# Patient Record
Sex: FEMALE | URBAN - METROPOLITAN AREA
[De-identification: names, ages, dates, MRNs, and addresses within clinical notes are randomized per-mention and may not be internally consistent; named-entity substitution may affect disease eponyms.]

---

## 2023-06-29 ENCOUNTER — OFFICE VISIT (OUTPATIENT)
Dept: FAMILY MEDICINE CLINIC | Facility: HOSPITAL | Age: 28
End: 2023-06-29
Payer: COMMERCIAL

## 2023-06-29 VITALS
SYSTOLIC BLOOD PRESSURE: 102 MMHG | BODY MASS INDEX: 27.01 KG/M2 | DIASTOLIC BLOOD PRESSURE: 76 MMHG | HEIGHT: 64 IN | TEMPERATURE: 98.7 F | WEIGHT: 158.2 LBS | HEART RATE: 72 BPM

## 2023-06-29 DIAGNOSIS — J31.0 CHRONIC RHINITIS: ICD-10-CM

## 2023-06-29 DIAGNOSIS — F41.9 ANXIETY: ICD-10-CM

## 2023-06-29 DIAGNOSIS — R51.9 CHRONIC NONINTRACTABLE HEADACHE, UNSPECIFIED HEADACHE TYPE: Primary | ICD-10-CM

## 2023-06-29 DIAGNOSIS — G89.29 CHRONIC NONINTRACTABLE HEADACHE, UNSPECIFIED HEADACHE TYPE: Primary | ICD-10-CM

## 2023-06-29 DIAGNOSIS — E66.3 OVERWEIGHT (BMI 25.0-29.9): ICD-10-CM

## 2023-06-29 DIAGNOSIS — R63.4 UNINTENTIONAL WEIGHT LOSS: ICD-10-CM

## 2023-06-29 DIAGNOSIS — F32.0 CURRENT MILD EPISODE OF MAJOR DEPRESSIVE DISORDER WITHOUT PRIOR EPISODE (HCC): ICD-10-CM

## 2023-06-29 PROCEDURE — 99203 OFFICE O/P NEW LOW 30 MIN: CPT | Performed by: INTERNAL MEDICINE

## 2023-06-29 RX ORDER — FEXOFENADINE HCL 180 MG/1
180 TABLET ORAL DAILY PRN
Qty: 30 TABLET | Refills: 5 | Status: SHIPPED | OUTPATIENT
Start: 2023-06-29

## 2023-06-29 RX ORDER — AMITRIPTYLINE HYDROCHLORIDE 25 MG/1
25 TABLET, FILM COATED ORAL
Qty: 30 TABLET | Refills: 1 | Status: SHIPPED | OUTPATIENT
Start: 2023-06-29

## 2023-06-29 RX ORDER — FEXOFENADINE HCL 180 MG/1
180 TABLET ORAL DAILY PRN
COMMUNITY
End: 2023-06-29 | Stop reason: SDUPTHER

## 2023-06-29 NOTE — ASSESSMENT & PLAN NOTE
D/w pt that sounds like chronic neck pain and HA's may be related to mood, however currently HA's daily and almost daily NSAID use noted - SE of daily NSAIDs with rebound HA's reviewed, abortive vs preventative tx reviewed - pt open to try daily preventative medication - options with TCA/Gabapentin/Duloxetine reviewed, has concurrent anxiety/depression/insomnia so will start with TCA - Amitriptyline 25 mg 1 tab PO qhs sent, SE reviewed,  d/w pt that it takes 4-6 wks to get maximum benefit of med and that med has to be taken every day and to not miss doses of med, call with SE or with any red flag Neuro symptoms (d/w pt in detail), may benefit from PT in future for neck - will follow

## 2023-06-29 NOTE — ASSESSMENT & PLAN NOTE
Noting down and anxious mood - depression more of an issue then anxiety - will try trial of Amitriptyline 25 mg 1 tab PO qhs,  d/w pt that it takes 4-6 wks to get maximum benefit of med and that med has to be taken every day and to not miss doses of med, call with SE/new/worse mood/panic attacks, re-eval in 6 wks, pt deferring therapist eval at this time

## 2023-06-29 NOTE — ASSESSMENT & PLAN NOTE
Noting down and anxious mood - depression more of an issue then anxiety - will try trial of Amitriptyline 25 mg 1 tab PO qhs,  d/w pt that it takes 4-6 wks to get maximum benefit of med and that med has to be taken every day and to not miss doses of med, call with SE/new/worse mood/SI, re-eval in 6 wks, pt deferring therapist eval at this time
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
## 2023-07-18 LAB
ALBUMIN SERPL-MCNC: 4.5 G/DL (ref 4–5)
ALBUMIN/GLOB SERPL: 2.3 {RATIO} (ref 1.2–2.2)
ALP SERPL-CCNC: 82 IU/L (ref 44–121)
ALT SERPL-CCNC: 11 IU/L (ref 0–32)
AST SERPL-CCNC: 16 IU/L (ref 0–40)
BASOPHILS # BLD AUTO: 0.1 X10E3/UL (ref 0–0.2)
BASOPHILS NFR BLD AUTO: 1 %
BILIRUB SERPL-MCNC: 0.8 MG/DL (ref 0–1.2)
BUN SERPL-MCNC: 13 MG/DL (ref 6–20)
BUN/CREAT SERPL: 18 (ref 9–23)
CALCIUM SERPL-MCNC: 9.3 MG/DL (ref 8.7–10.2)
CHLORIDE SERPL-SCNC: 105 MMOL/L (ref 96–106)
CHOLEST SERPL-MCNC: 175 MG/DL (ref 100–199)
CHOLEST/HDLC SERPL: 3.1 RATIO (ref 0–4.4)
CO2 SERPL-SCNC: 23 MMOL/L (ref 20–29)
CREAT SERPL-MCNC: 0.73 MG/DL (ref 0.57–1)
EGFR: 116 ML/MIN/1.73
EOSINOPHIL # BLD AUTO: 0.2 X10E3/UL (ref 0–0.4)
EOSINOPHIL NFR BLD AUTO: 2 %
ERYTHROCYTE [DISTWIDTH] IN BLOOD BY AUTOMATED COUNT: 12.6 % (ref 11.7–15.4)
GLOBULIN SER-MCNC: 2 G/DL (ref 1.5–4.5)
GLUCOSE SERPL-MCNC: 99 MG/DL (ref 70–99)
HCT VFR BLD AUTO: 40.2 % (ref 34–46.6)
HDLC SERPL-MCNC: 56 MG/DL
HGB BLD-MCNC: 12.6 G/DL (ref 11.1–15.9)
IMM GRANULOCYTES # BLD: 0 X10E3/UL (ref 0–0.1)
IMM GRANULOCYTES NFR BLD: 0 %
LDLC SERPL CALC-MCNC: 103 MG/DL (ref 0–99)
LYMPHOCYTES # BLD AUTO: 3.7 X10E3/UL (ref 0.7–3.1)
LYMPHOCYTES NFR BLD AUTO: 36 %
MCH RBC QN AUTO: 24 PG (ref 26.6–33)
MCHC RBC AUTO-ENTMCNC: 31.3 G/DL (ref 31.5–35.7)
MCV RBC AUTO: 77 FL (ref 79–97)
MONOCYTES # BLD AUTO: 0.6 X10E3/UL (ref 0.1–0.9)
MONOCYTES NFR BLD AUTO: 6 %
NEUTROPHILS # BLD AUTO: 5.8 X10E3/UL (ref 1.4–7)
NEUTROPHILS NFR BLD AUTO: 55 %
PLATELET # BLD AUTO: 244 X10E3/UL (ref 150–450)
POTASSIUM SERPL-SCNC: 4.2 MMOL/L (ref 3.5–5.2)
PROT SERPL-MCNC: 6.5 G/DL (ref 6–8.5)
RBC # BLD AUTO: 5.24 X10E6/UL (ref 3.77–5.28)
SL AMB VLDL CHOLESTEROL CALC: 16 MG/DL (ref 5–40)
SODIUM SERPL-SCNC: 142 MMOL/L (ref 134–144)
TRIGL SERPL-MCNC: 89 MG/DL (ref 0–149)
TSH SERPL DL<=0.005 MIU/L-ACNC: 1.25 UIU/ML (ref 0.45–4.5)
WBC # BLD AUTO: 10.4 X10E3/UL (ref 3.4–10.8)

## 2023-08-24 ENCOUNTER — OFFICE VISIT (OUTPATIENT)
Dept: FAMILY MEDICINE CLINIC | Facility: HOSPITAL | Age: 28
End: 2023-08-24
Payer: COMMERCIAL

## 2023-08-24 ENCOUNTER — OFFICE VISIT (OUTPATIENT)
Dept: URGENT CARE | Facility: CLINIC | Age: 28
End: 2023-08-24
Payer: COMMERCIAL

## 2023-08-24 VITALS
DIASTOLIC BLOOD PRESSURE: 68 MMHG | SYSTOLIC BLOOD PRESSURE: 127 MMHG | OXYGEN SATURATION: 99 % | RESPIRATION RATE: 18 BRPM | HEART RATE: 78 BPM

## 2023-08-24 VITALS
DIASTOLIC BLOOD PRESSURE: 60 MMHG | BODY MASS INDEX: 26.84 KG/M2 | WEIGHT: 157.2 LBS | OXYGEN SATURATION: 99 % | TEMPERATURE: 97.8 F | SYSTOLIC BLOOD PRESSURE: 122 MMHG | HEART RATE: 86 BPM | HEIGHT: 64 IN

## 2023-08-24 DIAGNOSIS — F32.0 CURRENT MILD EPISODE OF MAJOR DEPRESSIVE DISORDER WITHOUT PRIOR EPISODE (HCC): ICD-10-CM

## 2023-08-24 DIAGNOSIS — R51.9 CHRONIC NONINTRACTABLE HEADACHE, UNSPECIFIED HEADACHE TYPE: ICD-10-CM

## 2023-08-24 DIAGNOSIS — M79.642 HAND PAIN, LEFT: ICD-10-CM

## 2023-08-24 DIAGNOSIS — F41.9 ANXIETY: ICD-10-CM

## 2023-08-24 DIAGNOSIS — S60.222A CONTUSION OF LEFT HAND, INITIAL ENCOUNTER: Primary | ICD-10-CM

## 2023-08-24 DIAGNOSIS — G89.29 CHRONIC NONINTRACTABLE HEADACHE, UNSPECIFIED HEADACHE TYPE: ICD-10-CM

## 2023-08-24 PROCEDURE — 99214 OFFICE O/P EST MOD 30 MIN: CPT | Performed by: INTERNAL MEDICINE

## 2023-08-24 PROCEDURE — 99213 OFFICE O/P EST LOW 20 MIN: CPT

## 2023-08-24 RX ORDER — AMITRIPTYLINE HYDROCHLORIDE 50 MG/1
50 TABLET, FILM COATED ORAL
Qty: 30 TABLET | Refills: 2 | Status: SHIPPED | OUTPATIENT
Start: 2023-08-24

## 2023-08-24 NOTE — PROGRESS NOTES
[facility]        NAME: Robbert Koyanagi is a 32 y.o. female  : 1995    MRN: [id number]  DATE: 2023  TIME: 6:42 PM    Assessment and Plan   Contusion of left hand, initial encounter [S60.222A]  1. Contusion of left hand, initial encounter        2. Hand pain, left  XR hand 3+ vw left            Patient Instructions     Your x-rays were read by the provider. A radiologist will also read the x-rays and you will be notified of any abnormalities. Apply ice for 15min, 2-3 times daily over the next 2-3 days. You can take Tylenol or Motrin for pain. Follow-up with your PCP as needed / for continued hand pain. Go to the ED for any severely worsening symptoms. Chief Complaint     Chief Complaint   Patient presents with   • Hand Pain     Patient has left hand pain after banging it into a door this morning. History of Present Illness       This is a 27yo female who presents for evaluation of left hand pain after she smacked it against a wooden doorframe earlier today. Notes mild swelling and pain to her hand just below the ring and pinky finger. No numbness or tingling. Review of Systems   Review of Systems   Constitutional: Negative for chills and fever. Respiratory: Negative for chest tightness and shortness of breath. Cardiovascular: Negative for chest pain and palpitations. Musculoskeletal: Positive for arthralgias (left hand). Skin: Negative for wound.        Current Medications       Current Outpatient Medications:   •  amitriptyline (ELAVIL) 50 mg tablet, Take 1 tablet (50 mg total) by mouth daily at bedtime, Disp: 30 tablet, Rfl: 2  •  fexofenadine (ALLEGRA) 180 MG tablet, Take 1 tablet (180 mg total) by mouth daily as needed (allegies), Disp: 30 tablet, Rfl: 5    Current Allergies     Allergies as of 2023   • (No Known Allergies)            The following portions of the patient's history were reviewed and updated as appropriate: allergies, current medications, past family history, past medical history, past social history, past surgical history and problem list.     History reviewed. No pertinent past medical history. History reviewed. No pertinent surgical history. Family History   Problem Relation Age of Onset   • Diabetes Mother    • Heart Valve Disease Father    • No Known Problems Sister    • No Known Problems Sister    • No Known Problems Sister    • No Known Problems Brother    • No Known Problems Son    • Cancer Maternal Grandmother    • Heart disease Maternal Grandfather    • Dementia Paternal Grandmother          Medications have been verified. Objective   /68   Pulse 78   Resp 18   SpO2 99%        Physical Exam     Physical Exam  Vitals and nursing note reviewed. Constitutional:       General: She is not in acute distress. HENT:      Head: Normocephalic. Mouth/Throat:      Mouth: Mucous membranes are moist.   Cardiovascular:      Rate and Rhythm: Normal rate and regular rhythm. Pulses: Normal pulses. Heart sounds: Normal heart sounds. Pulmonary:      Effort: Pulmonary effort is normal.      Breath sounds: Normal breath sounds. Musculoskeletal:         General: Normal range of motion. Left wrist: Normal. Normal pulse (+2 radial). Left hand: Swelling and tenderness (mild over 4th/5th metacarpals) present. Normal range of motion. Normal strength. Normal sensation. Normal capillary refill. Cervical back: Normal range of motion and neck supple. Skin:     General: Skin is warm and dry. Capillary Refill: Capillary refill takes less than 2 seconds. Neurological:      Mental Status: She is alert and oriented to person, place, and time.

## 2023-08-24 NOTE — ASSESSMENT & PLAN NOTE
Pt reporting no anxiety today since starting Amitriptyline, call with new/worse mood, sleep is good as well, will follow

## 2023-08-24 NOTE — PROGRESS NOTES
Name: Leopold Jews      : 1995      MRN: [id number]  Encounter Provider: Daniel Bright DO  Encounter Date: 2023   Encounter department: [address]     1. Chronic nonintractable headache, unspecified headache type  Assessment & Plan:  HA's improved - still occurring intermittently and primarily triggered by neck pain/traveling - will increase Amitriptyline slightly from 25 mg to 50 mg,  d/w pt that it takes 4-6 wks to get maximum benefit of med and that med has to be taken every day and to not miss doses of med, call with SE/new/worse mood, re-eval in 6-8 wks      Orders:  -     amitriptyline (ELAVIL) 50 mg tablet; Take 1 tablet (50 mg total) by mouth daily at bedtime    2. Current mild episode of major depressive disorder without prior episode [facility])  Assessment & Plan:  Depression improved still having some down mood intermittently but feeing much better,  will increase Amitriptyline slightly from 25 mg to 50 mg,  d/w pt that it takes 4-6 wks to get maximum benefit of med and that med has to be taken every day and to not miss doses of med, call with SE/new/worse mood/SI, re-eval in 6-8 wks    Orders:  -     amitriptyline (ELAVIL) 50 mg tablet; Take 1 tablet (50 mg total) by mouth daily at bedtime    3. Anxiety  Assessment & Plan:  Pt reporting no anxiety today since starting Amitriptyline, call with new/worse mood, sleep is good as well, will follow    Orders:  -     amitriptyline (ELAVIL) 50 mg tablet; Take 1 tablet (50 mg total) by mouth daily at bedtime      PAP - still not done    BW         Subjective      HPI Pt here for follow up appt     Last visit she was noting anxiety/depression and HA's. She was started on Amitriptyline 25 mg 1 tab PO qhs. She is here for a mood/med check. She is taking the medication daily w/o significant SE. She had some constipation and was able to control with diet.   She notes HA's have decreased and are 60-70% better. Has some weeks with no head pain and some weeks with a few days of HA's - worse with travel or stressors - thinks related to neck pain. She notes no down/sad mood daily - just a few days. She notes anxiety and stress have resolved. She is sleeping much better. She is interested in trying a slightly higher dose. Review of Systems   Constitutional: Negative for chills, fever and unexpected weight change. HENT: Negative for congestion and sore throat. Eyes: Negative for pain and visual disturbance. Respiratory: Negative for cough and shortness of breath. Cardiovascular: Negative for chest pain and palpitations. Gastrointestinal: Positive for abdominal pain and constipation. Negative for blood in stool, diarrhea, nausea and vomiting. Intermittent moving lower abd pain   Genitourinary: Negative for difficulty urinating, dysuria, menstrual problem, vaginal bleeding and vaginal pain. Musculoskeletal: Negative for back pain and neck pain. Skin: Negative for rash and wound. Neurological: Positive for headaches. Negative for dizziness and light-headedness. Hematological: Does not bruise/bleed easily. Psychiatric/Behavioral: Positive for dysphoric mood. Negative for confusion and sleep disturbance. The patient is not nervous/anxious. Current Outpatient Medications on File Prior to Visit   Medication Sig   • fexofenadine (ALLEGRA) 180 MG tablet Take 1 tablet (180 mg total) by mouth daily as needed (allegies)   • [DISCONTINUED] amitriptyline (ELAVIL) 25 mg tablet Take 1 tablet (25 mg total) by mouth daily at bedtime       Objective     /60 (BP Location: Left arm, Patient Position: Sitting, Cuff Size: Standard)   Pulse 86   Temp 97.8 °F (36.6 °C) (Tympanic)   Ht 5' 4" (1.626 m)   Wt 71.3 kg (157 lb 3.2 oz)   SpO2 99%   BMI 26.98 kg/m²     Physical Exam  Vitals and nursing note reviewed. Constitutional:       General: She is not in acute distress. Appearance: She is well-developed. She is not ill-appearing. HENT:      Head: Normocephalic and atraumatic. Eyes:      General:         Right eye: No discharge. Left eye: No discharge. Conjunctiva/sclera: Conjunctivae normal.   Neck:      Trachea: No tracheal deviation. Cardiovascular:      Rate and Rhythm: Normal rate and regular rhythm. Heart sounds: Normal heart sounds. No murmur heard. No friction rub. Pulmonary:      Effort: Pulmonary effort is normal. No respiratory distress. Breath sounds: Normal breath sounds. No wheezing, rhonchi or rales. Abdominal:      General: There is no distension. Palpations: Abdomen is soft. Tenderness: There is no abdominal tenderness. There is no guarding or rebound. Musculoskeletal:      Cervical back: Neck supple. Right lower leg: No edema. Left lower leg: No edema. Skin:     General: Skin is warm. Coloration: Skin is not pale. Findings: No rash. Neurological:      General: No focal deficit present. Mental Status: She is alert. Motor: No abnormal muscle tone. Gait: Gait normal.   Psychiatric:         Mood and Affect: Mood normal.         Behavior: Behavior normal.         Thought Content:  Thought content normal.         Judgment: Judgment normal.       Melanie Wyatt DO

## 2023-08-24 NOTE — ASSESSMENT & PLAN NOTE
HA's improved - still occurring intermittently and primarily triggered by neck pain/traveling - will increase Amitriptyline slightly from 25 mg to 50 mg,  d/w pt that it takes 4-6 wks to get maximum benefit of med and that med has to be taken every day and to not miss doses of med, call with SE/new/worse mood, re-eval in 6-8 wks

## 2023-08-24 NOTE — ASSESSMENT & PLAN NOTE
Depression improved still having some down mood intermittently but feeing much better,  will increase Amitriptyline slightly from 25 mg to 50 mg,  d/w pt that it takes 4-6 wks to get maximum benefit of med and that med has to be taken every day and to not miss doses of med, call with SE/new/worse mood/SI, re-eval in 6-8 wks

## 2023-08-24 NOTE — PATIENT INSTRUCTIONS
Your x-rays were read by the provider. A radiologist will also read the x-rays and you will be notified of any abnormalities. Apply ice for 15min, 2-3 times daily over the next 2-3 days. You can take Tylenol or Motrin for pain. Follow-up with your PCP as needed / for continued hand pain. Go to the ED for any severely worsening symptoms.

## 2023-08-29 ENCOUNTER — APPOINTMENT (OUTPATIENT)
Dept: RADIOLOGY | Facility: CLINIC | Age: 28
End: 2023-08-29

## 2023-08-29 ENCOUNTER — OCCMED (OUTPATIENT)
Dept: URGENT CARE | Facility: CLINIC | Age: 28
End: 2023-08-29

## 2023-08-29 DIAGNOSIS — Z02.89 ENCOUNTER FOR OCCUPATIONAL HEALTH ASSESSMENT: ICD-10-CM

## 2023-08-29 DIAGNOSIS — Z02.89 ENCOUNTER FOR OCCUPATIONAL HEALTH ASSESSMENT: Primary | ICD-10-CM

## 2023-08-29 PROCEDURE — 71045 X-RAY EXAM CHEST 1 VIEW: CPT

## 2023-09-11 DIAGNOSIS — J31.0 CHRONIC RHINITIS: ICD-10-CM

## 2023-09-12 RX ORDER — FEXOFENADINE HCL 180 MG/1
180 TABLET ORAL DAILY PRN
Qty: 30 TABLET | Refills: 0 | Status: SHIPPED | OUTPATIENT
Start: 2023-09-12

## 2023-11-12 DIAGNOSIS — F32.0 CURRENT MILD EPISODE OF MAJOR DEPRESSIVE DISORDER WITHOUT PRIOR EPISODE (HCC): ICD-10-CM

## 2023-11-12 DIAGNOSIS — F41.9 ANXIETY: ICD-10-CM

## 2023-11-12 DIAGNOSIS — R51.9 CHRONIC NONINTRACTABLE HEADACHE, UNSPECIFIED HEADACHE TYPE: ICD-10-CM

## 2023-11-12 DIAGNOSIS — J31.0 CHRONIC RHINITIS: ICD-10-CM

## 2023-11-12 DIAGNOSIS — G89.29 CHRONIC NONINTRACTABLE HEADACHE, UNSPECIFIED HEADACHE TYPE: ICD-10-CM

## 2023-11-13 RX ORDER — FEXOFENADINE HCL 180 MG/1
180 TABLET ORAL DAILY PRN
Qty: 30 TABLET | Refills: 0 | Status: SHIPPED | OUTPATIENT
Start: 2023-11-13

## 2023-11-13 RX ORDER — AMITRIPTYLINE HYDROCHLORIDE 50 MG/1
50 TABLET, FILM COATED ORAL
Qty: 30 TABLET | Refills: 0 | Status: SHIPPED | OUTPATIENT
Start: 2023-11-13

## 2023-11-20 ENCOUNTER — HOSPITAL ENCOUNTER (EMERGENCY)
Facility: HOSPITAL | Age: 28
Discharge: HOME | End: 2023-11-20
Attending: EMERGENCY MEDICINE
Payer: COMMERCIAL

## 2023-11-20 VITALS
DIASTOLIC BLOOD PRESSURE: 72 MMHG | OXYGEN SATURATION: 100 % | HEART RATE: 102 BPM | TEMPERATURE: 98.4 F | HEIGHT: 64 IN | RESPIRATION RATE: 18 BRPM | BODY MASS INDEX: 27.1 KG/M2 | WEIGHT: 158.73 LBS | SYSTOLIC BLOOD PRESSURE: 128 MMHG

## 2023-11-20 DIAGNOSIS — R10.10 PAIN OF UPPER ABDOMEN: Primary | ICD-10-CM

## 2023-11-20 LAB
ALBUMIN SERPL BCP-MCNC: 4.3 G/DL (ref 3.5–5)
ALP SERPL-CCNC: 60 U/L (ref 34–104)
ALT SERPL W P-5'-P-CCNC: 20 U/L (ref 7–52)
ANION GAP SERPL CALCULATED.3IONS-SCNC: 6 MMOL/L
AST SERPL W P-5'-P-CCNC: 23 U/L (ref 13–39)
BASOPHILS # BLD AUTO: 0.01 THOUSANDS/ÂΜL (ref 0–0.1)
BASOPHILS NFR BLD AUTO: 0 % (ref 0–1)
BILIRUB SERPL-MCNC: 0.61 MG/DL (ref 0.2–1)
BUN SERPL-MCNC: 16 MG/DL (ref 5–25)
CALCIUM SERPL-MCNC: 9.1 MG/DL (ref 8.4–10.2)
CHLORIDE SERPL-SCNC: 107 MMOL/L (ref 96–108)
CO2 SERPL-SCNC: 26 MMOL/L (ref 21–32)
CREAT SERPL-MCNC: 0.66 MG/DL (ref 0.6–1.3)
EOSINOPHIL # BLD AUTO: 0.19 THOUSAND/ÂΜL (ref 0–0.61)
EOSINOPHIL NFR BLD AUTO: 2 % (ref 0–6)
ERYTHROCYTE [DISTWIDTH] IN BLOOD BY AUTOMATED COUNT: 13.2 % (ref 11.6–15.1)
EXT PREGNANCY TEST URINE: NEGATIVE
EXT. CONTROL: NORMAL
GFR SERPL CREATININE-BSD FRML MDRD: 120 ML/MIN/1.73SQ M
GLUCOSE SERPL-MCNC: 101 MG/DL (ref 65–140)
HCT VFR BLD AUTO: 38.3 % (ref 34.8–46.1)
HGB BLD-MCNC: 11.7 G/DL (ref 11.5–15.4)
IMM GRANULOCYTES # BLD AUTO: 0.03 THOUSAND/UL (ref 0–0.2)
IMM GRANULOCYTES NFR BLD AUTO: 0 % (ref 0–2)
LIPASE SERPL-CCNC: 18 U/L (ref 11–82)
LYMPHOCYTES # BLD AUTO: 2.19 THOUSANDS/ÂΜL (ref 0.6–4.47)
LYMPHOCYTES NFR BLD AUTO: 24 % (ref 14–44)
MCH RBC QN AUTO: 23.6 PG (ref 26.8–34.3)
MCHC RBC AUTO-ENTMCNC: 30.5 G/DL (ref 31.4–37.4)
MCV RBC AUTO: 77 FL (ref 82–98)
MONOCYTES # BLD AUTO: 0.92 THOUSAND/ÂΜL (ref 0.17–1.22)
MONOCYTES NFR BLD AUTO: 10 % (ref 4–12)
NEUTROPHILS # BLD AUTO: 5.69 THOUSANDS/ÂΜL (ref 1.85–7.62)
NEUTS SEG NFR BLD AUTO: 64 % (ref 43–75)
NRBC BLD AUTO-RTO: 0 /100 WBCS
PLATELET # BLD AUTO: 196 THOUSANDS/UL (ref 149–390)
PMV BLD AUTO: 10 FL (ref 8.9–12.7)
POTASSIUM SERPL-SCNC: 3.4 MMOL/L (ref 3.5–5.3)
PROT SERPL-MCNC: 6.7 G/DL (ref 6.4–8.4)
RBC # BLD AUTO: 4.96 MILLION/UL (ref 3.81–5.12)
SODIUM SERPL-SCNC: 139 MMOL/L (ref 135–147)
WBC # BLD AUTO: 9.03 THOUSAND/UL (ref 4.31–10.16)

## 2023-11-20 PROCEDURE — 85025 COMPLETE CBC W/AUTO DIFF WBC: CPT | Performed by: EMERGENCY MEDICINE

## 2023-11-20 PROCEDURE — 83690 ASSAY OF LIPASE: CPT | Performed by: EMERGENCY MEDICINE

## 2023-11-20 PROCEDURE — 80053 COMPREHEN METABOLIC PANEL: CPT | Performed by: EMERGENCY MEDICINE

## 2023-11-20 PROCEDURE — 36415 COLL VENOUS BLD VENIPUNCTURE: CPT | Performed by: EMERGENCY MEDICINE

## 2023-11-20 PROCEDURE — 96361 HYDRATE IV INFUSION ADD-ON: CPT

## 2023-11-20 PROCEDURE — 99283 EMERGENCY DEPT VISIT LOW MDM: CPT

## 2023-11-20 PROCEDURE — 99284 EMERGENCY DEPT VISIT MOD MDM: CPT | Performed by: EMERGENCY MEDICINE

## 2023-11-20 PROCEDURE — 96375 TX/PRO/DX INJ NEW DRUG ADDON: CPT

## 2023-11-20 PROCEDURE — C9113 INJ PANTOPRAZOLE SODIUM, VIA: HCPCS | Performed by: EMERGENCY MEDICINE

## 2023-11-20 PROCEDURE — 81025 URINE PREGNANCY TEST: CPT | Performed by: EMERGENCY MEDICINE

## 2023-11-20 PROCEDURE — 96374 THER/PROPH/DIAG INJ IV PUSH: CPT

## 2023-11-20 RX ORDER — PANTOPRAZOLE SODIUM 40 MG/10ML
40 INJECTION, POWDER, LYOPHILIZED, FOR SOLUTION INTRAVENOUS ONCE
Status: COMPLETED | OUTPATIENT
Start: 2023-11-20 | End: 2023-11-20

## 2023-11-20 RX ORDER — ONDANSETRON 2 MG/ML
4 INJECTION INTRAMUSCULAR; INTRAVENOUS ONCE
Status: COMPLETED | OUTPATIENT
Start: 2023-11-20 | End: 2023-11-20

## 2023-11-20 RX ADMIN — ONDANSETRON 4 MG: 2 INJECTION INTRAMUSCULAR; INTRAVENOUS at 20:41

## 2023-11-20 RX ADMIN — PANTOPRAZOLE SODIUM 40 MG: 40 INJECTION, POWDER, FOR SOLUTION INTRAVENOUS at 20:41

## 2023-11-20 RX ADMIN — SODIUM CHLORIDE 500 ML: 0.9 INJECTION, SOLUTION INTRAVENOUS at 20:41

## 2023-11-21 ENCOUNTER — TELEPHONE (OUTPATIENT)
Dept: FAMILY MEDICINE CLINIC | Facility: HOSPITAL | Age: 28
End: 2023-11-21

## 2023-11-21 NOTE — TELEPHONE ENCOUNTER
11/21/23 4:09 PM    Patient contacted post ED visit, first outreach attempt made. Message was left for patient to return a call to the [facility] Department at [facility]: Phone [phone]. Thank you.   Evelio Sarah  [facility]

## 2023-11-21 NOTE — ED PROVIDER NOTES
History  Chief Complaint   Patient presents with    Abdominal Pain     Pt c/o epigastric pain, nausea, and gas since yesterday. Reports taking 8mg zofran around 1700 and had nausea relief. Denies vomiting and fevers. Hx of acid reflux. Patient is a 80-year-old female who presents with abdominal pain. Patient states that she has a history of GERD and when she is going to eat a meal that she knows will flare her GERD she takes famotidine prior to the meal.  She states that yesterday at 2 PM she had such a meal and did take famotidine prior. She states that shortly thereafter she developed pain in the epigastrium radiating to the right and left upper quadrants, constant, burning in nature, moderate in intensity associated with some nausea. States that typically after she takes some Tums, the pain goes away, but this time it did not. She states that she is taken Zofran and that is not helping the nausea either. Prior to Admission Medications   Prescriptions Last Dose Informant Patient Reported? Taking?   amitriptyline (ELAVIL) 50 mg tablet   No No   Sig: Take 1 tablet (50 mg total) by mouth daily at bedtime   fexofenadine (ALLEGRA) 180 MG tablet   No No   Sig: Take 1 tablet (180 mg total) by mouth daily as needed (allegies)      Facility-Administered Medications: None       Past Medical History:   Diagnosis Date    Chronic pain     Depression        History reviewed. No pertinent surgical history. Family History   Problem Relation Age of Onset    Diabetes Mother     Heart Valve Disease Father     No Known Problems Sister     No Known Problems Sister     No Known Problems Sister     No Known Problems Brother     No Known Problems Son     Cancer Maternal Grandmother     Heart disease Maternal Grandfather     Dementia Paternal Grandmother      I have reviewed and agree with the history as documented.     E-Cigarette/Vaping    E-Cigarette Use Never User      E-Cigarette/Vaping Substances    Nicotine No THC No     CBD No     Flavoring No     Other No     Unknown No      Social History     Tobacco Use    Smoking status: Never    Smokeless tobacco: Never   Vaping Use    Vaping Use: Never used   Substance Use Topics    Alcohol use: Not Currently    Drug use: Never       Review of Systems   Constitutional:  Negative for chills and fever. Respiratory:  Negative for shortness of breath. Cardiovascular:  Negative for chest pain. Gastrointestinal:  Positive for abdominal pain and nausea. Negative for abdominal distention, blood in stool, constipation, diarrhea and vomiting. Genitourinary:  Negative for dysuria, flank pain and hematuria. Physical Exam  Physical Exam  Vitals and nursing note reviewed. Constitutional:       General: She is not in acute distress. Appearance: Normal appearance. She is not ill-appearing, toxic-appearing or diaphoretic. HENT:      Head: Normocephalic and atraumatic. Mouth/Throat:      Mouth: Mucous membranes are moist.   Eyes:      Conjunctiva/sclera: Conjunctivae normal.      Pupils: Pupils are equal, round, and reactive to light. Cardiovascular:      Rate and Rhythm: Normal rate and regular rhythm. Pulses: Normal pulses. Heart sounds: Normal heart sounds. No murmur heard. Pulmonary:      Effort: Pulmonary effort is normal. No respiratory distress. Breath sounds: Normal breath sounds. No stridor. No wheezing, rhonchi or rales. Chest:      Chest wall: No tenderness. Abdominal:      General: Bowel sounds are normal. There is no distension. Palpations: Abdomen is soft. Tenderness: There is abdominal tenderness in the right upper quadrant, epigastric area and left upper quadrant. There is no right CVA tenderness, left CVA tenderness, guarding or rebound. Negative signs include Valero's sign, Rovsing's sign, McBurney's sign, psoas sign and obturator sign. Skin:     General: Skin is warm and dry.    Neurological:      General: No focal deficit present. Mental Status: She is alert and oriented to person, place, and time. Mental status is at baseline.    Psychiatric:         Mood and Affect: Mood normal.         Behavior: Behavior normal.         Vital Signs  ED Triage Vitals [11/20/23 2007]   Temperature Pulse Respirations Blood Pressure SpO2   98.4 °F (36.9 °C) 102 18 128/72 100 %      Temp Source Heart Rate Source Patient Position - Orthostatic VS BP Location FiO2 (%)   Temporal -- -- Left arm --      Pain Score       5           Vitals:    11/20/23 2007   BP: 128/72   Pulse: 102         Visual Acuity      ED Medications  Medications   sodium chloride 0.9 % bolus 500 mL (0 mL Intravenous Stopped 11/20/23 2129)   ondansetron (ZOFRAN) injection 4 mg (4 mg Intravenous Given 11/20/23 2041)   pantoprazole (PROTONIX) injection 40 mg (40 mg Intravenous Given 11/20/23 2041)       Diagnostic Studies  Results Reviewed       Procedure Component Value Units Date/Time    Comprehensive metabolic panel [[id number]]  (Abnormal) Collected: 11/20/23 2046    Lab Status: Final result Specimen: Blood from Arm, Left Updated: 11/20/23 2109     Sodium 139 mmol/L      Potassium 3.4 mmol/L      Chloride 107 mmol/L      CO2 26 mmol/L      ANION GAP 6 mmol/L      BUN 16 mg/dL      Creatinine 0.66 mg/dL      Glucose 101 mg/dL      Calcium 9.1 mg/dL      AST 23 U/L      ALT 20 U/L      Alkaline Phosphatase 60 U/L      Total Protein 6.7 g/dL      Albumin 4.3 g/dL      Total Bilirubin 0.61 mg/dL      eGFR 120 ml/min/1.73sq m     Narrative:      [place] guidelines for Chronic Kidney Disease (CKD):     Stage 1 with normal or high GFR (GFR > 90 mL/min/1.73 square meters)    Stage 2 Mild CKD (GFR = 60-89 mL/min/1.73 square meters)    Stage 3A Moderate CKD (GFR = 45-59 mL/min/1.73 square meters)    Stage 3B Moderate CKD (GFR = 30-44 mL/min/1.73 square meters)    Stage 4 Severe CKD (GFR = 15-29 mL/min/1.73 square meters)    Stage 5 End Stage CKD (GFR <15 mL/min/1.73 square meters)  Note: GFR calculation is accurate only with a steady state creatinine    Lipase [[id number]]  (Normal) Collected: 11/20/23 2046    Lab Status: Final result Specimen: Blood from Arm, Left Updated: 11/20/23 2109     Lipase 18 u/L     CBC and differential [[id number]]  (Abnormal) Collected: 11/20/23 2046    Lab Status: Final result Specimen: Blood from Arm, Left Updated: 11/20/23 2051     WBC 9.03 Thousand/uL      RBC 4.96 Million/uL      Hemoglobin 11.7 g/dL      Hematocrit 38.3 %      MCV 77 fL      MCH 23.6 pg      MCHC 30.5 g/dL      RDW 13.2 %      MPV 10.0 fL      Platelets 108 Thousands/uL      nRBC 0 /100 WBCs      Neutrophils Relative 64 %      Immat GRANS % 0 %      Lymphocytes Relative 24 %      Monocytes Relative 10 %      Eosinophils Relative 2 %      Basophils Relative 0 %      Neutrophils Absolute 5.69 Thousands/µL      Immature Grans Absolute 0.03 Thousand/uL      Lymphocytes Absolute 2.19 Thousands/µL      Monocytes Absolute 0.92 Thousand/µL      Eosinophils Absolute 0.19 Thousand/µL      Basophils Absolute 0.01 Thousands/µL     POCT pregnancy, urine [[id number]]  (Normal) Resulted: 11/20/23 2046    Lab Status: Final result Updated: 11/20/23 2046     EXT Preg Test, Ur Negative     Control Valid                   No orders to display              Procedures  Procedures         ED Course  ED Course as of 11/20/23 2135   [facility] Nov 20, 2023 2133 Patient feels slightly improved. I offered a dose of reglan but patient declines and is ready to go home. Will place referral for GI. Reviewed strict RTED precautions which patient verbalized understanding of. Medical Decision Making  Assessment and plan:  Upper abdominal pain and nausea. Differential includes gastritis/GERD versus pancreatitis versus biliary colic/acute cholecystitis.   Check labs to evaluate for leukocytosis, anemia, electrolyte abnormalities, kidney and liver function; treat symptomatically; reassess. Amount and/or Complexity of Data Reviewed  Labs: ordered. Risk  Prescription drug management. Disposition  Final diagnoses:   Pain of upper abdomen     Time reflects when diagnosis was documented in both MDM as applicable and the Disposition within this note       Time User Action Codes Description Comment    11/20/2023  9:31 PM Mario Cortes Add [R10.10] Pain of upper abdomen           ED Disposition       ED Disposition   Discharge    Condition   Stable    Date/Time   Mon Nov 20, 2023  9:26 PM    Comment   Campos Deluna discharge to home/self care.                    Follow-up Information       Follow up With Specialties Details Why Contact Info Additional [address],  Internal Medicine, Family Medicine Schedule an appointment as soon as possible for a visit in 3 days for re-evaluation [address] [address] [phone]        [facility] Emergency Medicine Go to  As needed, If symptoms worsen, for re-evaluation [address] [facility] [address]  [phone] [address] [facility], [address] [address], [address] [facility], [address] [facility],[address] Floor    [address] [facility] [address] [facility] Gastroenterology Schedule an appointment as soon as possible for a visit in 1 week for re-evaluation [address] [address]  [address] [facility] [address] [facility] [address] [facility] 6907 [facility]     [phone]            Patient's Medications   Discharge Prescriptions    No medications on file           PDMP Review       None            ED Provider  Electronically Signed by             Mario Cortes DO  11/20/23 1381

## 2023-11-24 NOTE — TELEPHONE ENCOUNTER
11/24/23 1:51 PM    Patient contacted post ED visit, [facility] department spoke with patient/caregiver and outreach was successful. Thank you.   Kelsey Sorensen  [facility]

## 2023-12-01 DIAGNOSIS — J31.0 CHRONIC RHINITIS: ICD-10-CM

## 2023-12-01 DIAGNOSIS — R51.9 CHRONIC NONINTRACTABLE HEADACHE, UNSPECIFIED HEADACHE TYPE: ICD-10-CM

## 2023-12-01 DIAGNOSIS — G89.29 CHRONIC NONINTRACTABLE HEADACHE, UNSPECIFIED HEADACHE TYPE: ICD-10-CM

## 2023-12-01 DIAGNOSIS — F32.0 CURRENT MILD EPISODE OF MAJOR DEPRESSIVE DISORDER WITHOUT PRIOR EPISODE (HCC): ICD-10-CM

## 2023-12-01 DIAGNOSIS — F41.9 ANXIETY: ICD-10-CM

## 2023-12-01 RX ORDER — AMITRIPTYLINE HYDROCHLORIDE 50 MG/1
50 TABLET, FILM COATED ORAL
Qty: 30 TABLET | Refills: 0 | Status: SHIPPED | OUTPATIENT
Start: 2023-12-01

## 2023-12-01 RX ORDER — FEXOFENADINE HCL 180 MG/1
180 TABLET ORAL DAILY PRN
Qty: 30 TABLET | Refills: 0 | Status: SHIPPED | OUTPATIENT
Start: 2023-12-01

## 2023-12-01 NOTE — TELEPHONE ENCOUNTER
Please notify patient that their medication was approved but no refills were given. She is overdue for follow up appt and needs to make an appt within the next 30 days to con't receiving medications.   TY

## 2023-12-01 NOTE — TELEPHONE ENCOUNTER
Requested medication(s) are due for refill today: Yes  Patient has already received a courtesy refill: No  Other reason request has been forwarded to provider: pt may be requesting too early, please advise, thank you

## 2024-01-19 ENCOUNTER — OFFICE VISIT (OUTPATIENT)
Dept: FAMILY MEDICINE CLINIC | Facility: HOSPITAL | Age: 29
End: 2024-01-19
Payer: COMMERCIAL

## 2024-01-19 VITALS
SYSTOLIC BLOOD PRESSURE: 131 MMHG | DIASTOLIC BLOOD PRESSURE: 78 MMHG | HEART RATE: 93 BPM | HEIGHT: 64 IN | BODY MASS INDEX: 28.13 KG/M2 | TEMPERATURE: 98.7 F | OXYGEN SATURATION: 98 % | WEIGHT: 164.8 LBS

## 2024-01-19 DIAGNOSIS — Z00.00 ANNUAL PHYSICAL EXAM: Primary | ICD-10-CM

## 2024-01-19 DIAGNOSIS — Z12.4 ENCOUNTER FOR PAPANICOLAOU SMEAR FOR CERVICAL CANCER SCREENING: ICD-10-CM

## 2024-01-19 DIAGNOSIS — F32.0 CURRENT MILD EPISODE OF MAJOR DEPRESSIVE DISORDER WITHOUT PRIOR EPISODE (HCC): ICD-10-CM

## 2024-01-19 DIAGNOSIS — G89.29 CHRONIC NONINTRACTABLE HEADACHE, UNSPECIFIED HEADACHE TYPE: ICD-10-CM

## 2024-01-19 DIAGNOSIS — R71.8 MICROCYTOSIS: ICD-10-CM

## 2024-01-19 DIAGNOSIS — F41.9 ANXIETY: ICD-10-CM

## 2024-01-19 DIAGNOSIS — R51.9 CHRONIC NONINTRACTABLE HEADACHE, UNSPECIFIED HEADACHE TYPE: ICD-10-CM

## 2024-01-19 PROCEDURE — 99395 PREV VISIT EST AGE 18-39: CPT | Performed by: INTERNAL MEDICINE

## 2024-01-19 RX ORDER — FAMOTIDINE 20 MG/1
40 TABLET, FILM COATED ORAL DAILY PRN
COMMUNITY

## 2024-01-19 RX ORDER — AMITRIPTYLINE HYDROCHLORIDE 50 MG/1
50 TABLET, FILM COATED ORAL
Qty: 30 TABLET | Refills: 5 | Status: SHIPPED | OUTPATIENT
Start: 2024-01-19

## 2024-01-19 NOTE — ASSESSMENT & PLAN NOTE
Mood doing well with current Amitriptyline, wishes for no changes, has some constipation with rx but able to control easily with high fiber diet - call with new/worse SE or new/worse mood/SI, re-eval in 6mos

## 2024-01-19 NOTE — ASSESSMENT & PLAN NOTE
Mood doing well with current Amitriptyline, wishes for no changes, has some constipation with rx but able to control easily with high fiber diet - call with new/worse SE or new/worse mood/panic attacks, re-eval in 6mos

## 2024-01-19 NOTE — PATIENT INSTRUCTIONS

## 2024-01-19 NOTE — ASSESSMENT & PLAN NOTE
Improved with TCA, only about 1 HA a week and easily controlled, feels no changes to medication is needed, call with new/worse symptoms

## 2024-01-19 NOTE — PROGRESS NOTES
ADULT ANNUAL PHYSICAL  [facility] PRIMARY CARE [address]     NAME: Lisandra Babin  AGE: 28 y.o. SEX: female  : 1995     DATE: 2024     Assessment and Plan:     Problem List Items Addressed This Visit          Other    Chronic nonintractable headache     Improved with TCA, only about 1 HA a week and easily controlled, feels no changes to medication is needed, call with new/worse symptoms         Relevant Medications    amitriptyline (ELAVIL) 50 mg tablet    Current mild episode of major depressive disorder without prior episode (HCC)     Mood doing well with current Amitriptyline, wishes for no changes, has some constipation with rx but able to control easily with high fiber diet - call with new/worse SE or new/worse mood/SI, re-eval in 6mos         Relevant Medications    amitriptyline (ELAVIL) 50 mg tablet    Anxiety     Mood doing well with current Amitriptyline, wishes for no changes, has some constipation with rx but able to control easily with high fiber diet - call with new/worse SE or new/worse mood/panic attacks, re-eval in 6mos         Relevant Medications    amitriptyline (ELAVIL) 50 mg tablet     Other Visit Diagnoses       Annual physical exam    -  Primary    BMI 28.0-28.9,adult        healthy diet and regular formal exercise encouraged    Encounter for Papanicolaou smear for cervical cancer screening        Relevant Orders    Ambulatory Referral to Gynecology    Microcytosis        recheck CBC/iron/ferritin/TIBC, denies heavy menses, notes family h/o iron def    Relevant Orders    CBC    Fe+TIBC+Enrique            Immunizations and preventive care screenings were discussed with patient today. Appropriate education was printed on patient's after visit summary.    Counseling:  Alcohol/drug use: discussed moderation in alcohol intake, the recommendations for healthy alcohol use, and avoidance of illicit drug use.  Dental Health: discussed importance  of regular tooth brushing, flossing, and dental visits.  Injury prevention: discussed safety/seat belts, safety helmets, smoke detectors, carbon dioxide detectors, and smoking near bedding or upholstery.  Exercise: the importance of regular exercise/physical activity was discussed. Recommend exercise 3-5 times per week for at least 30 minutes.   Cervical cancer screening: Overdue for PAP - number for GYN given         Return in about 6 months (around 7/22/2024) for Recheck.     Chief Complaint:     Chief Complaint   Patient presents with    Follow-up      History of Present Illness:     Adult Annual Physical   Patient here for a comprehensive physical exam. The patient reports no problems.  She notes no issues with down/sad mood and notes no significant anxious/stressed mood. She states sleep is good. She has had mild constipation but has been easy to manage with high fiber diet.     Diet and Physical Activity  Diet/Nutrition: poor diet, limited junk food, consuming 3-5 servings of fruits/vegetables daily, and was just on vacay and that was when diet was poor .   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   BMI reviewed - up 7 lbs from 8/23     Depression Screening  PHQ-2/9 Depression Screening           General Health  Sleep: sleeps well.   Hearing: normal - none .  Vision: no vision problems and most recent eye exam <1 year ago.   Dental: regular dental visits and brushes teeth twice daily.       /GYN Health  Follows with gynecology? no   Last menstrual period: Jan 1st and regular  Contraceptive method: barrier methods.  History of STDs?: no.     Advanced Care Planning  Do you have an advanced directive? no  Do you have a durable medical power of ? no     Review of Systems:     Review of Systems   Constitutional:  Negative for chills, fatigue, fever and unexpected weight change.   HENT:  Negative for congestion, hearing loss and sore throat.    Eyes:  Negative for pain and visual  disturbance.   Respiratory:  Negative for cough, shortness of breath and wheezing.    Cardiovascular:  Negative for chest pain, palpitations and leg swelling.   Gastrointestinal:  Positive for constipation. Negative for abdominal pain, blood in stool, diarrhea, nausea and vomiting.   Genitourinary:  Negative for difficulty urinating, dysuria and menstrual problem.   Musculoskeletal:  Negative for back pain and neck pain.   Skin:  Negative for rash and wound.   Neurological:  Negative for dizziness, light-headedness and headaches.   Hematological:  Does not bruise/bleed easily.   Psychiatric/Behavioral:  Negative for confusion, dysphoric mood and sleep disturbance. The patient is not nervous/anxious.       Past Medical History:     Past Medical History:   Diagnosis Date    Chronic pain     Depression       Past Surgical History:     History reviewed. No pertinent surgical history.   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   Tobacco Use    Smoking status: Never    Smokeless tobacco: Never   Vaping Use    Vaping status: Never Used   Substance and Sexual Activity    Alcohol use: Not Currently    Drug use: Never    Sexual activity: None   Other Topics Concern    None   Social History Narrative    None     Social Determinants of Health     Financial Resource Strain: Not on file   Food Insecurity: Not on file   Transportation Needs: Not on file   Physical Activity: Not on file   Stress: Not on file   Social Connections: Not on file   Intimate Partner Violence: Not on file   Housing Stability: Not on file      Family History:     Family History   Problem Relation Age of Onset    Diabetes Mother     Heart Valve Disease Father     No Known Problems Sister     No Known Problems Sister     No  "Known Problems Sister     No Known Problems Brother     No Known Problems Son     Cancer Maternal Grandmother     Heart disease Maternal Grandfather     Dementia Paternal Grandmother       Current Medications:     Current Outpatient Medications   Medication Sig Dispense Refill    amitriptyline (ELAVIL) 50 mg tablet Take 1 tablet (50 mg total) by mouth daily at bedtime 30 tablet 5    famotidine (PEPCID) 20 mg tablet Take 40 mg by mouth daily as needed for heartburn      fexofenadine (ALLEGRA) 180 MG tablet Take 1 tablet (180 mg total) by mouth daily as needed (allegies) 30 tablet 0     No current facility-administered medications for this visit.      Allergies:     No Known Allergies   Physical Exam:     /78 (BP Location: Left arm, Patient Position: Sitting, Cuff Size: Standard)   Pulse 93   Temp 98.7 °F (37.1 °C) (Tympanic)   Ht 5' 4\" (1.626 m)   Wt 74.8 kg (164 lb 12.8 oz)   SpO2 98%   BMI 28.29 kg/m²     Physical Exam  Vitals and nursing note reviewed.   Constitutional:       General: She is not in acute distress.     Appearance: She is well-developed. She is not ill-appearing.   HENT:      Head: Normocephalic and atraumatic.      Right Ear: Tympanic membrane and external ear normal. There is no impacted cerumen.      Left Ear: Tympanic membrane and external ear normal. There is no impacted cerumen.      Mouth/Throat:      Mouth: Mucous membranes are moist.      Pharynx: Oropharynx is clear. No oropharyngeal exudate.   Eyes:      General:         Right eye: No discharge.         Left eye: No discharge.      Conjunctiva/sclera: Conjunctivae normal.   Neck:      Thyroid: No thyromegaly.      Trachea: No tracheal deviation.   Cardiovascular:      Rate and Rhythm: Normal rate and regular rhythm.      Heart sounds: Normal heart sounds. No murmur heard.  Pulmonary:      Effort: Pulmonary effort is normal. No respiratory distress.      Breath sounds: Normal breath sounds. No wheezing, rhonchi or rales. "   Abdominal:      General: There is no distension.      Palpations: Abdomen is soft.      Tenderness: There is no abdominal tenderness. There is no guarding or rebound.   Musculoskeletal:         General: No deformity or signs of injury.      Cervical back: Neck supple.   Lymphadenopathy:      Cervical: No cervical adenopathy.   Skin:     General: Skin is warm and dry.      Coloration: Skin is not pale.      Findings: No bruising or rash.   Neurological:      General: No focal deficit present.      Mental Status: She is alert. Mental status is at baseline.      Motor: No abnormal muscle tone.      Gait: Gait normal.   Psychiatric:         Mood and Affect: Mood normal.         Behavior: Behavior normal.         Thought Content: Thought content normal.         Judgment: Judgment normal.          Lupe Booth DO   [facility] PRIMARY CARE [address]

## 2024-01-23 LAB
ERYTHROCYTE [DISTWIDTH] IN BLOOD BY AUTOMATED COUNT: 13.2 % (ref 11.7–15.4)
FERRITIN SERPL-MCNC: 23 NG/ML (ref 15–150)
HCT VFR BLD AUTO: 39.3 % (ref 34–46.6)
HGB BLD-MCNC: 12.8 G/DL (ref 11.1–15.9)
IRON SATN MFR SERPL: 53 % (ref 15–55)
IRON SERPL-MCNC: 174 UG/DL (ref 27–159)
MCH RBC QN AUTO: 23.8 PG (ref 26.6–33)
MCHC RBC AUTO-ENTMCNC: 32.6 G/DL (ref 31.5–35.7)
MCV RBC AUTO: 73 FL (ref 79–97)
PLATELET # BLD AUTO: 252 X10E3/UL (ref 150–450)
RBC # BLD AUTO: 5.38 X10E6/UL (ref 3.77–5.28)
TIBC SERPL-MCNC: 330 UG/DL (ref 250–450)
UIBC SERPL-MCNC: 156 UG/DL (ref 131–425)
WBC # BLD AUTO: 8.7 X10E3/UL (ref 3.4–10.8)

## 2024-04-01 ENCOUNTER — OFFICE VISIT (OUTPATIENT)
Dept: URGENT CARE | Facility: CLINIC | Age: 29
End: 2024-04-01
Payer: COMMERCIAL

## 2024-04-01 VITALS
HEIGHT: 63 IN | DIASTOLIC BLOOD PRESSURE: 69 MMHG | WEIGHT: 163 LBS | SYSTOLIC BLOOD PRESSURE: 112 MMHG | RESPIRATION RATE: 18 BRPM | HEART RATE: 72 BPM | BODY MASS INDEX: 28.88 KG/M2 | OXYGEN SATURATION: 100 %

## 2024-04-01 DIAGNOSIS — Z02.89 ENCOUNTER FOR PHYSICAL EXAMINATION RELATED TO EMPLOYMENT: Primary | ICD-10-CM

## 2024-04-01 NOTE — PROGRESS NOTES
Lisandra presents today with request for a pre-employment physical. Patient denies any recent history of concussion or trauma. She takes Amitriptyline daily for migraines. Denies any history of syncope, dizziness, chest pain, shortness of breath with exertion or exercise. No cardiac or murmur history. No h/o back injury.    No previous surgeries.

## 2024-06-06 ENCOUNTER — OFFICE VISIT (OUTPATIENT)
Dept: FAMILY MEDICINE CLINIC | Facility: HOSPITAL | Age: 29
End: 2024-06-06
Payer: COMMERCIAL

## 2024-06-06 VITALS
HEIGHT: 63 IN | SYSTOLIC BLOOD PRESSURE: 128 MMHG | BODY MASS INDEX: 28.49 KG/M2 | DIASTOLIC BLOOD PRESSURE: 80 MMHG | WEIGHT: 160.8 LBS | HEART RATE: 90 BPM | OXYGEN SATURATION: 98 % | TEMPERATURE: 96.2 F

## 2024-06-06 DIAGNOSIS — F32.0 CURRENT MILD EPISODE OF MAJOR DEPRESSIVE DISORDER WITHOUT PRIOR EPISODE (HCC): ICD-10-CM

## 2024-06-06 DIAGNOSIS — F41.9 ANXIETY: ICD-10-CM

## 2024-06-06 DIAGNOSIS — J31.0 CHRONIC RHINITIS: ICD-10-CM

## 2024-06-06 DIAGNOSIS — G89.29 CHRONIC NONINTRACTABLE HEADACHE, UNSPECIFIED HEADACHE TYPE: Primary | ICD-10-CM

## 2024-06-06 DIAGNOSIS — N92.6 IRREGULAR MENSES: ICD-10-CM

## 2024-06-06 DIAGNOSIS — R51.9 CHRONIC NONINTRACTABLE HEADACHE, UNSPECIFIED HEADACHE TYPE: Primary | ICD-10-CM

## 2024-06-06 PROCEDURE — 99214 OFFICE O/P EST MOD 30 MIN: CPT | Performed by: INTERNAL MEDICINE

## 2024-06-06 RX ORDER — MONTELUKAST SODIUM 10 MG/1
10 TABLET ORAL
Qty: 30 TABLET | Refills: 5 | Status: SHIPPED | OUTPATIENT
Start: 2024-06-06

## 2024-06-06 RX ORDER — DULOXETIN HYDROCHLORIDE 20 MG/1
20 CAPSULE, DELAYED RELEASE ORAL DAILY
Qty: 30 CAPSULE | Refills: 2 | Status: SHIPPED | OUTPATIENT
Start: 2024-06-06

## 2024-06-06 RX ORDER — HYDROQUINONE 40 MG/G
CREAM TOPICAL
COMMUNITY
Start: 2024-03-20

## 2024-06-06 NOTE — ASSESSMENT & PLAN NOTE
Mood not at goal with Amitriptyline and pt noting too much sedation with the rx, will wean off the TCA and trial Cymbalta - starting at 20 mg q day,  d/w pt that it takes 4-6 wks to get maximum benefit of med and that med has to be taken every day and to not miss doses of med, call with new/worse mood/panic attacks/SE, re-eval in 6-8 wks

## 2024-06-06 NOTE — ASSESSMENT & PLAN NOTE
HA's improved and not an issue at this time, she is UTT sedation with Amitriptyline, will switch to Cymbalta, re-eval in 6-8 wks, call with new/worse symptoms

## 2024-06-06 NOTE — ASSESSMENT & PLAN NOTE
Mood not at goal with Amitriptyline and pt noting too much sedation with the rx, will wean off the TCA and trial Cymbalta - starting at 20 mg q day,  d/w pt that it takes 4-6 wks to get maximum benefit of med and that med has to be taken every day and to not miss doses of med, call with new/worse mood/SI/SE, re-eval in 6-8 wks

## 2024-06-06 NOTE — PROGRESS NOTES
Ambulatory Visit  Name: Lisandra Babin      : 1995      MRN: [id number]  Encounter Provider: Lupe Booth DO  Encounter Date: 2024   Encounter department: [facility] CARE [address]     Assessment & Plan   1. Chronic nonintractable headache, unspecified headache type  Assessment & Plan:  HA's improved and not an issue at this time, she is UTT sedation with Amitriptyline, will switch to Cymbalta, re-eval in 6-8 wks, call with new/worse symptoms  2. Anxiety  Assessment & Plan:  Mood not at goal with Amitriptyline and pt noting too much sedation with the rx, will wean off the TCA and trial Cymbalta - starting at 20 mg q day,  d/w pt that it takes 4-6 wks to get maximum benefit of med and that med has to be taken every day and to not miss doses of med, call with new/worse mood/panic attacks/SE, re-eval in 6-8 wks  Orders:  -     DULoxetine (CYMBALTA) 20 mg capsule; Take 1 capsule (20 mg total) by mouth daily  3. Current mild episode of major depressive disorder without prior episode (HCC)  Assessment & Plan:  Mood not at goal with Amitriptyline and pt noting too much sedation with the rx, will wean off the TCA and trial Cymbalta - starting at 20 mg q day,  d/w pt that it takes 4-6 wks to get maximum benefit of med and that med has to be taken every day and to not miss doses of med, call with new/worse mood/SI/SE, re-eval in 6-8 wks    Orders:  -     DULoxetine (CYMBALTA) 20 mg capsule; Take 1 capsule (20 mg total) by mouth daily  4. Irregular menses  Comments:  Needs GYN eval - referral placed and number given, overdue for PAP  Orders:  -     Ambulatory Referral to Obstetrics / Gynecology; Future  5. Chronic rhinitis  Assessment & Plan:  Using Allegra and Singular prn - rx refilled upon request today  Orders:  -     montelukast (SINGULAIR) 10 mg tablet; Take 1 tablet (10 mg total) by mouth daily at bedtime      Depression Screening and Follow-up Plan: Patient was screened for  "depression during today's encounter. They screened negative with a PHQ-9 score of 4.    PAP - no baseline in chart    BW 7/23      History of Present Illness     HPI Pt here for follow up appt    She con't to take her Amitriptyline 50 mg 1 tab PO qhs for her depression/anxiety and chronic HA's.  She notes her appetite is up and she is tired a lot and sleeping more. She is not sure if that is d/t the medication. She notes no recent HA's.  She is down at times \"just some days\" and feels anxious at times as well. She is sleeping well at night.     PAP - no baseline in chart, has had some irregular menses since Dec, she is trying to get pregnant        Review of Systems   Constitutional:  Negative for chills and fever.   HENT:  Positive for postnasal drip and rhinorrhea.    Eyes:  Negative for pain and visual disturbance.   Respiratory:  Negative for cough and shortness of breath.    Cardiovascular:  Negative for chest pain and palpitations.   Gastrointestinal:  Negative for abdominal pain and nausea.   Genitourinary:  Positive for menstrual problem.   Musculoskeletal:  Negative for back pain and neck pain.   Skin:  Negative for rash and wound.   Neurological:  Negative for dizziness and headaches.   Hematological:  Does not bruise/bleed easily.   Psychiatric/Behavioral:  Positive for dysphoric mood. The patient is nervous/anxious.        Objective     /80   Pulse 90   Temp (!) 96.2 °F (35.7 °C)   Ht 5' 3\" (1.6 m)   Wt 72.9 kg (160 lb 12.8 oz)   SpO2 98%   BMI 28.48 kg/m²     Physical Exam  Vitals and nursing note reviewed.   Constitutional:       General: She is not in acute distress.     Appearance: She is well-developed. She is not ill-appearing.   HENT:      Head: Normocephalic and atraumatic.      Right Ear: External ear normal.      Left Ear: External ear normal.   Eyes:      General:         Right eye: No discharge.         Left eye: No discharge.      Conjunctiva/sclera: Conjunctivae normal.   Neck: "      Thyroid: No thyromegaly.      Trachea: No tracheal deviation.   Cardiovascular:      Rate and Rhythm: Normal rate and regular rhythm.      Heart sounds: Normal heart sounds. No murmur heard.  Pulmonary:      Effort: Pulmonary effort is normal. No respiratory distress.      Breath sounds: Normal breath sounds. No wheezing, rhonchi or rales.   Abdominal:      General: There is no distension.      Palpations: Abdomen is soft.      Tenderness: There is no abdominal tenderness. There is no guarding or rebound.   Musculoskeletal:         General: No deformity or signs of injury.      Cervical back: Neck supple.   Skin:     General: Skin is warm and dry.      Coloration: Skin is not pale.      Findings: No bruising or rash.   Neurological:      General: No focal deficit present.      Mental Status: She is alert. Mental status is at baseline.      Motor: No abnormal muscle tone.      Gait: Gait normal.   Psychiatric:         Behavior: Behavior normal.         Thought Content: Thought content normal.         Judgment: Judgment normal.      Comments: Mildly anxious appearing        Administrative Statements

## 2024-06-20 ENCOUNTER — OFFICE VISIT (OUTPATIENT)
Dept: OBGYN CLINIC | Facility: CLINIC | Age: 29
End: 2024-06-20
Payer: COMMERCIAL

## 2024-06-20 VITALS
WEIGHT: 158.8 LBS | SYSTOLIC BLOOD PRESSURE: 106 MMHG | BODY MASS INDEX: 28.14 KG/M2 | HEIGHT: 63 IN | DIASTOLIC BLOOD PRESSURE: 64 MMHG

## 2024-06-20 DIAGNOSIS — Z01.419 WELL WOMAN EXAM: Primary | ICD-10-CM

## 2024-06-20 DIAGNOSIS — N92.6 IRREGULAR MENSES: ICD-10-CM

## 2024-06-20 DIAGNOSIS — Z30.011 ORAL CONTRACEPTION INITIATION: ICD-10-CM

## 2024-06-20 DIAGNOSIS — Z11.3 SCREEN FOR STD (SEXUALLY TRANSMITTED DISEASE): ICD-10-CM

## 2024-06-20 PROCEDURE — 99213 OFFICE O/P EST LOW 20 MIN: CPT | Performed by: OBSTETRICS & GYNECOLOGY

## 2024-06-20 PROCEDURE — 99385 PREV VISIT NEW AGE 18-39: CPT | Performed by: OBSTETRICS & GYNECOLOGY

## 2024-06-20 RX ORDER — DROSPIRENONE AND ETHINYL ESTRADIOL 0.02-3(28)
1 KIT ORAL DAILY
Qty: 84 TABLET | Refills: 1 | Status: SHIPPED | OUTPATIENT
Start: 2024-06-20

## 2024-06-20 NOTE — ASSESSMENT & PLAN NOTE
Will check labs and ultrasound. Discussed this is likely anovulatory bleeding.  RTO in 3 mo to check on OCPs

## 2024-06-20 NOTE — PROGRESS NOTES
[facility] [place]  [address] [place] [address]    ASSESSMENT/PLAN: Lisandra Babin is a 28 y.o.  who presents for annual gynecologic exam.    Encounter for routine gynecologic examination  - Routine well woman exam completed today.  - Cervical Cancer Screening: Current ASCCP Guidelines reviewed. Last Pap: Not on file . History of abnormal: denies  - HPV Vaccination status: Not immunized  - STI screening offered including HIV testing: GC/CT and blood work ordered  - Contraceptive counseling discussed.  Current contraception: no method, desires to start OCPs, discussed r/b/a:   - The following were reviewed in today's visit: breast self exam, STD testing, use and side effects of OCPs, adequate intake of calcium and vitamin D, exercise, and healthy diet    Additional problems addressed during this visit:  1. Well woman exam  -     IGP, CtNg, rfx Aptima HPV ASCU  2. Irregular menses  Comments:  Needs GYN eval - referral placed and number given, overdue for PAP  Assessment & Plan:  Will check labs and ultrasound. Discussed this is likely anovulatory bleeding.  RTO in 3 mo to check on OCPs  Orders:  -     Ambulatory Referral to Obstetrics / Gynecology  -     US pelvis complete w transvaginal; Future; Expected date: 2024  -     TSH, 3rd generation with Free T4 reflex; Future  -     TSH, 3rd generation with Free T4 reflex  3. Oral contraception initiation  -     drospirenone-ethinyl estradiol (HUONG) 3-0.02 MG per tablet; Take 1 tablet by mouth daily  4. Screen for STD (sexually transmitted disease)  -     Hepatitis C antibody; Future  -     Human Immunodeficiency Virus 1/2 Antigen / Antibody ( Fourth Generation) with Reflex Testing; Future  -     RPR, Rfx Qn RPR/Confirm TP; Future  -     Hepatitis C antibody  -     Human Immunodeficiency Virus 1/2 Antigen / Antibody ( Fourth Generation) with Reflex Testing  -     RPR, Rfx Qn RPR/Confirm TP      CC:  Annual Gynecologic Examination    HPI:  Lisandra Babin is a 28 y.o.  who presents for annual gynecologic examination. She reports for the last 6 months she has had irregular bleeding. Some months her cycle skips, other times she has increased bleeding. She also noticed some bleeding after intercourse, not every time. She is concerned about ovarian cysts causing her irregular bleeding as this is a new problem for her. She is interested in starting OCPs as she does not desire pregnancy for another year and desires to start to help with hormonal acne.     ROS: Negative except as noted in HPI    Patient's last menstrual period was 06/15/2024 (exact date).       She  reports being sexually active and has had partner(s) who are male. She reports using the following method of birth control/protection: None.       The following portions of the patient's history were reviewed and updated as appropriate:   History reviewed. No pertinent past medical history.  History reviewed. No pertinent surgical history.  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   Tobacco Use    Smoking status: Never    Smokeless tobacco: Never   Vaping Use    Vaping status: Never Used   Substance and Sexual Activity    Alcohol use: Not Currently    Drug use: Never    Sexual activity: Yes     Partners: Male     Birth control/protection: None   Other Topics Concern    None   Social History Narrative    None     Social Determinants of  "Health     Financial Resource Strain: Not on file   Food Insecurity: Not on file   Transportation Needs: Not on file   Physical Activity: Not on file   Stress: Not on file   Social Connections: Not on file   Intimate Partner Violence: Not on file   Housing Stability: Not on file     Outpatient Medications Marked as Taking for the 6/20/24 encounter (Office Visit) with Larissa Strickland V, DO   Medication    drospirenone-ethinyl estradiol (HUONG) 3-0.02 MG per tablet    DULoxetine (CYMBALTA) 20 mg capsule    famotidine (PEPCID) 20 mg tablet    fexofenadine (ALLEGRA) 180 MG tablet    hydroquinone 4 % cream    montelukast (SINGULAIR) 10 mg tablet     No Known Allergies        Objective:  /64 (BP Location: Left arm, Patient Position: Sitting, Cuff Size: Standard)   Ht 5' 3\" (1.6 m)   Wt 72 kg (158 lb 12.8 oz)   LMP 06/15/2024 (Exact Date)   Breastfeeding No   BMI 28.13 kg/m²        Chaperone present? Pt declined    General Appearance: alert and oriented, in no acute distress.   Respiratory: Unlabored breathing.  Abdomen: Soft, non-tender, non-distended, no masses, no rebound or guarding.  Breast Exam: No dimpling, nipple retraction or discharge. No lumps or masses. No axillary or supraclavicular nodes.   Pelvic:   External genitalia: Normal appearance, no abnormal pigmentation, no lesions or masses. Normal Bartholin's and [place]'s.      Urinary system: Urethral meatus normal,       Bladder non-tender.      Vaginal: normal mucosa without prolapse or lesions. Normal-appearing physiologic discharge.      Cervix: Normal-appearing, well-epithelialized, no gross lesions or masses. No cervical motion tenderness.      Adnexa: No adnexal masses or tenderness noted.      Uterus: Normal-sized, regular contour, midline, mobile, no uterine tenderness.  Extremities: Normal range of motion. Warm, well-perfused, non-tender.   Skin: normal, no rash or abnormalities  Neurologic: alert, oriented x3  Psychiatric: Appropriate affect, " mood stable, cooperative with exam.        Larissa Strickland,   6/20/2024 2:38 PM

## 2024-06-23 LAB
HCV AB S/CO SERPL IA: NON REACTIVE
HIV 1+2 AB+HIV1 P24 AG SERPL QL IA: NON REACTIVE
RPR SER QL: NON REACTIVE

## 2024-06-25 LAB
C TRACH RRNA CVX QL NAA+PROBE: NEGATIVE
CYTOLOGIST CVX/VAG CYTO: NORMAL
DX ICD CODE: NORMAL
N GONORRHOEA RRNA CVX QL NAA+PROBE: NEGATIVE
OTHER STN SPEC: NORMAL
OTHER STN SPEC: NORMAL
PATH REPORT.FINAL DX SPEC: NORMAL
SL AMB NOTE:: NORMAL
SL AMB SPECIMEN ADEQUACY: NORMAL
SL AMB TEST METHODOLOGY: NORMAL

## 2024-06-30 ENCOUNTER — PATIENT MESSAGE (OUTPATIENT)
Dept: FAMILY MEDICINE CLINIC | Facility: HOSPITAL | Age: 29
End: 2024-06-30

## 2024-07-01 DIAGNOSIS — T78.40XA ALLERGY, INITIAL ENCOUNTER: Primary | ICD-10-CM

## 2024-07-01 NOTE — PATIENT COMMUNICATION
Patient is calling to request that the allergy referral be faxed to [facility] at [phone].    Please let her know when completed so that she may call to schedule.

## 2024-07-10 ENCOUNTER — HOSPITAL ENCOUNTER (OUTPATIENT)
Dept: ULTRASOUND IMAGING | Facility: HOSPITAL | Age: 29
Discharge: HOME | End: 2024-07-10
Attending: OBSTETRICS & GYNECOLOGY
Payer: COMMERCIAL

## 2024-07-10 DIAGNOSIS — N92.6 IRREGULAR MENSES: ICD-10-CM

## 2024-07-10 PROCEDURE — 76856 US EXAM PELVIC COMPLETE: CPT

## 2024-07-10 PROCEDURE — 76830 TRANSVAGINAL US NON-OB: CPT

## 2024-08-09 ENCOUNTER — PROCEDURE VISIT (OUTPATIENT)
Dept: OBGYN CLINIC | Facility: CLINIC | Age: 29
End: 2024-08-09
Payer: COMMERCIAL

## 2024-08-09 VITALS
DIASTOLIC BLOOD PRESSURE: 60 MMHG | SYSTOLIC BLOOD PRESSURE: 118 MMHG | BODY MASS INDEX: 27.11 KG/M2 | WEIGHT: 153 LBS | HEIGHT: 63 IN

## 2024-08-09 DIAGNOSIS — Z23 NEED FOR HPV VACCINATION: ICD-10-CM

## 2024-08-09 DIAGNOSIS — Z32.02 NEGATIVE PREGNANCY TEST: ICD-10-CM

## 2024-08-09 DIAGNOSIS — N92.6 IRREGULAR MENSES: Primary | ICD-10-CM

## 2024-08-09 LAB — SL AMB POCT URINE HCG: NORMAL

## 2024-08-09 PROCEDURE — 90651 9VHPV VACCINE 2/3 DOSE IM: CPT | Performed by: OBSTETRICS & GYNECOLOGY

## 2024-08-09 PROCEDURE — 90471 IMMUNIZATION ADMIN: CPT | Performed by: OBSTETRICS & GYNECOLOGY

## 2024-08-09 PROCEDURE — 81025 URINE PREGNANCY TEST: CPT | Performed by: OBSTETRICS & GYNECOLOGY

## 2024-08-09 PROCEDURE — 58100 BIOPSY OF UTERUS LINING: CPT | Performed by: OBSTETRICS & GYNECOLOGY

## 2024-08-09 RX ORDER — SPIRONOLACTONE 50 MG/1
50 TABLET, FILM COATED ORAL DAILY
COMMUNITY

## 2024-08-09 NOTE — PROGRESS NOTES
"Endometrial biopsy    Date/Time: 8/9/2024 11:30 AM    Performed by: Larissa COFFEY DO  Authorized by: Larissa COFFEY DO  Universal Protocol:  Consent: Verbal consent obtained.  Risks and benefits: risks, benefits and alternatives were discussed  Consent given by: patient  Time out: Immediately prior to procedure a \"time out\" was called to verify the correct patient, procedure, equipment, support staff and site/side marked as required.  Patient understanding: patient states understanding of the procedure being performed  Relevant documents: relevant documents present and verified  Patient identity confirmed: verbally with patient    Indication:     Indications: Other disorder of menstruation and other abnormal bleeding from female genital tract    Pre-procedure:     Premeds:  Ibuprofen  Procedure:     Procedure: endometrial biopsy with Pipelle      A bivalve speculum was placed in the vagina: yes      Cervix cleaned and prepped: yes      The cervix was dilated: no      Uterus sounded: yes      Uterus sound depth (cm):  8    Specimen collected: specimen collected and sent to pathology      Patient tolerated procedure well with no complications: yes    Findings:     Uterus size:  Non-gravid    Pt reports her periods are now regular. Has not started OCPs and is not interested in starting them.   "

## 2024-08-13 LAB
PATH REPORT.SITE OF ORIGIN SPEC: NORMAL
PAYMENT PROCEDURE: NORMAL
SL AMB .: NORMAL

## 2024-08-16 ENCOUNTER — OFFICE VISIT (OUTPATIENT)
Dept: FAMILY MEDICINE CLINIC | Facility: HOSPITAL | Age: 29
End: 2024-08-16
Payer: COMMERCIAL

## 2024-08-16 VITALS
HEIGHT: 63 IN | SYSTOLIC BLOOD PRESSURE: 108 MMHG | DIASTOLIC BLOOD PRESSURE: 60 MMHG | HEART RATE: 79 BPM | WEIGHT: 157.8 LBS | OXYGEN SATURATION: 99 % | BODY MASS INDEX: 27.96 KG/M2 | TEMPERATURE: 97.8 F

## 2024-08-16 DIAGNOSIS — G89.29 CHRONIC NONINTRACTABLE HEADACHE, UNSPECIFIED HEADACHE TYPE: ICD-10-CM

## 2024-08-16 DIAGNOSIS — F32.0 CURRENT MILD EPISODE OF MAJOR DEPRESSIVE DISORDER WITHOUT PRIOR EPISODE (HCC): Primary | ICD-10-CM

## 2024-08-16 DIAGNOSIS — R51.9 CHRONIC NONINTRACTABLE HEADACHE, UNSPECIFIED HEADACHE TYPE: ICD-10-CM

## 2024-08-16 DIAGNOSIS — F41.9 ANXIETY: ICD-10-CM

## 2024-08-16 DIAGNOSIS — R87.619 ABNORMAL CERVICAL PAPANICOLAOU SMEAR, UNSPECIFIED ABNORMAL PAP FINDING: ICD-10-CM

## 2024-08-16 PROCEDURE — 99214 OFFICE O/P EST MOD 30 MIN: CPT | Performed by: INTERNAL MEDICINE

## 2024-08-16 RX ORDER — ADAPALENE GEL USP, 0.3% 3 MG/G
GEL TOPICAL
COMMUNITY
Start: 2024-08-05

## 2024-08-16 RX ORDER — AMITRIPTYLINE HYDROCHLORIDE 50 MG/1
50 TABLET ORAL
Qty: 30 TABLET | Refills: 5 | Status: SHIPPED | OUTPATIENT
Start: 2024-08-16

## 2024-08-16 NOTE — ASSESSMENT & PLAN NOTE
Pt went back to Amitriptyline d/t better symptom control, med list updated, call with new/worse mood

## 2024-08-16 NOTE — ASSESSMENT & PLAN NOTE
Pt went back to Amitriptyline d/t better symptom control, med list updated, call with new/worse mood/panic attacks

## 2024-08-16 NOTE — ASSESSMENT & PLAN NOTE
Better control with TCA than Cymbalta, back on Amitriptyline with only 1 HA the past month, con't current regimen, call with new/worse symptoms, re-eval in 6 mos   normal/ROM intact/normal gait/strength 5/5 bilateral upper extremities/strength 5/5 bilateral lower extremities

## 2024-08-16 NOTE — PROGRESS NOTES
Ambulatory Visit  Name: Lisandra Babin      : 1995      MRN: [id number]  Encounter Provider: Lupe Booth DO  Encounter Date: 2024   Encounter department: [facility] PRIMARY CARE [address]     Assessment & Plan   1. Current mild episode of major depressive disorder without prior episode (HCC)  Assessment & Plan:  Pt went back to Amitriptyline d/t better symptom control, med list updated, call with new/worse mood  Orders:  -     amitriptyline (ELAVIL) 50 mg tablet; Take 1 tablet (50 mg total) by mouth daily at bedtime  2. Anxiety  Assessment & Plan:  Pt went back to Amitriptyline d/t better symptom control, med list updated, call with new/worse mood/panic attacks  Orders:  -     amitriptyline (ELAVIL) 50 mg tablet; Take 1 tablet (50 mg total) by mouth daily at bedtime  3. Chronic nonintractable headache, unspecified headache type  Assessment & Plan:  Better control with TCA than Cymbalta, back on Amitriptyline with only 1 HA the past month, con't current regimen, call with new/worse symptoms, re-eval in 6 mos  Orders:  -     amitriptyline (ELAVIL) 50 mg tablet; Take 1 tablet (50 mg total) by mouth daily at bedtime  4. Abnormal cervical Papanicolaou smear, unspecified abnormal pap finding  Comments:  s/p endometrial bx - bx w/o malignancy, f/u as per GYN       PAP     BW   FLP       History of Present Illness     HPI Pt here for follow up appt    In  we weaned pt of Amitriptyline d/t pt feeling too sedated on the rx.  We subsequently started her on Cymbalta 20 mg 1 tab PO q day for anxiety and depression and to help with chronic HA's.  She stopped the Cymbalta as she felt it made her HA's worse. She went back on Amitriptyline and is taking 50 mg qhs. She notes mood is doing much better and her HA's have improved.  She has had only 1 HA the past month. She notes no down/sad mood and sleep is good. She notes some stress with M-I-L with leukemia.  She is happy with current  "dose of TCA.      Pt had an abnormal PAP (squamous metaplastic cells)with Dr. Strickland in June.  She subsequently had an endometrial bx 8/9/24 and results were neg for malignancy.           Review of Systems   Constitutional:  Negative for chills and fever.   HENT:  Negative for congestion and sore throat.    Eyes:  Negative for pain and visual disturbance.   Respiratory:  Negative for cough and shortness of breath.    Cardiovascular:  Negative for chest pain and palpitations.   Gastrointestinal:  Positive for constipation. Negative for abdominal pain, blood in stool, diarrhea and nausea.   Genitourinary:  Negative for difficulty urinating and dysuria.   Musculoskeletal:  Negative for back pain and neck pain.   Skin:  Negative for rash and wound.   Neurological:  Positive for headaches. Negative for dizziness, light-headedness and numbness.   Hematological:  Does not bruise/bleed easily.   Psychiatric/Behavioral:  Negative for confusion and dysphoric mood.        Objective     /60   Pulse 79   Temp 97.8 °F (36.6 °C)   Ht 5' 3\" (1.6 m)   Wt 71.6 kg (157 lb 12.8 oz)   SpO2 99%   BMI 27.95 kg/m²     Physical Exam  Vitals and nursing note reviewed.   Constitutional:       General: She is not in acute distress.     Appearance: She is well-developed. She is not ill-appearing.   HENT:      Head: Normocephalic and atraumatic.      Right Ear: External ear normal.      Left Ear: External ear normal.   Eyes:      General:         Right eye: No discharge.         Left eye: No discharge.      Conjunctiva/sclera: Conjunctivae normal.   Neck:      Thyroid: No thyromegaly.      Trachea: No tracheal deviation.   Cardiovascular:      Rate and Rhythm: Normal rate and regular rhythm.      Heart sounds: Normal heart sounds. No murmur heard.  Pulmonary:      Effort: Pulmonary effort is normal. No respiratory distress.      Breath sounds: Normal breath sounds. No wheezing, rhonchi or rales.   Abdominal:      General: There is " no distension.      Palpations: Abdomen is soft.      Tenderness: There is no abdominal tenderness. There is no guarding or rebound.   Musculoskeletal:         General: No deformity or signs of injury.      Cervical back: Neck supple.   Skin:     General: Skin is warm and dry.      Coloration: Skin is not pale.      Findings: No bruising or rash.   Neurological:      General: No focal deficit present.      Mental Status: She is alert. Mental status is at baseline.      Motor: No abnormal muscle tone.      Gait: Gait normal.   Psychiatric:         Mood and Affect: Mood normal.         Behavior: Behavior normal.         Thought Content: Thought content normal.         Judgment: Judgment normal.       Administrative Statements

## 2024-12-11 ENCOUNTER — APPOINTMENT (EMERGENCY)
Dept: CT IMAGING | Facility: HOSPITAL | Age: 29
End: 2024-12-11
Payer: COMMERCIAL

## 2024-12-11 ENCOUNTER — HOSPITAL ENCOUNTER (EMERGENCY)
Facility: HOSPITAL | Age: 29
Discharge: HOME | End: 2024-12-11
Attending: EMERGENCY MEDICINE
Payer: COMMERCIAL

## 2024-12-11 VITALS
DIASTOLIC BLOOD PRESSURE: 63 MMHG | RESPIRATION RATE: 16 BRPM | TEMPERATURE: 97.8 F | HEART RATE: 80 BPM | SYSTOLIC BLOOD PRESSURE: 107 MMHG | OXYGEN SATURATION: 99 %

## 2024-12-11 DIAGNOSIS — E27.49 ADRENAL CALCIFICATION (HCC): ICD-10-CM

## 2024-12-11 DIAGNOSIS — R10.13 EPIGASTRIC PAIN: ICD-10-CM

## 2024-12-11 DIAGNOSIS — R11.2 NAUSEA & VOMITING: ICD-10-CM

## 2024-12-11 DIAGNOSIS — N39.0 UTI (URINARY TRACT INFECTION): Primary | ICD-10-CM

## 2024-12-11 LAB
ALBUMIN SERPL BCG-MCNC: 4.9 G/DL (ref 3.5–5)
ALP SERPL-CCNC: 69 U/L (ref 34–104)
ALT SERPL W P-5'-P-CCNC: 10 U/L (ref 7–52)
ANION GAP SERPL CALCULATED.3IONS-SCNC: 7 MMOL/L (ref 4–13)
APTT PPP: 29 SECONDS (ref 23–34)
AST SERPL W P-5'-P-CCNC: 14 U/L (ref 13–39)
BACTERIA UR QL AUTO: ABNORMAL /HPF
BASOPHILS # BLD AUTO: 0.03 THOUSANDS/ÂΜL (ref 0–0.1)
BASOPHILS NFR BLD AUTO: 0 % (ref 0–1)
BILIRUB SERPL-MCNC: 0.84 MG/DL (ref 0.2–1)
BILIRUB UR QL STRIP: NEGATIVE
BUN SERPL-MCNC: 16 MG/DL (ref 5–25)
CALCIUM SERPL-MCNC: 9.4 MG/DL (ref 8.4–10.2)
CHLORIDE SERPL-SCNC: 105 MMOL/L (ref 96–108)
CLARITY UR: ABNORMAL
CO2 SERPL-SCNC: 28 MMOL/L (ref 21–32)
COLOR UR: YELLOW
CREAT SERPL-MCNC: 0.7 MG/DL (ref 0.6–1.3)
EOSINOPHIL # BLD AUTO: 0.18 THOUSAND/ÂΜL (ref 0–0.61)
EOSINOPHIL NFR BLD AUTO: 1 % (ref 0–6)
ERYTHROCYTE [DISTWIDTH] IN BLOOD BY AUTOMATED COUNT: 12.3 % (ref 11.6–15.1)
EXT PREGNANCY TEST URINE: NEGATIVE
EXT. CONTROL: NORMAL
GFR SERPL CREATININE-BSD FRML MDRD: 117 ML/MIN/1.73SQ M
GLUCOSE SERPL-MCNC: 80 MG/DL (ref 65–140)
GLUCOSE UR STRIP-MCNC: NEGATIVE MG/DL
GRAN CASTS #/AREA URNS LPF: ABNORMAL /[LPF]
HCT VFR BLD AUTO: 41.6 % (ref 34.8–46.1)
HGB BLD-MCNC: 13.3 G/DL (ref 11.5–15.4)
HGB UR QL STRIP.AUTO: NEGATIVE
HYALINE CASTS #/AREA URNS LPF: ABNORMAL /LPF
IMM GRANULOCYTES # BLD AUTO: 0.07 THOUSAND/UL (ref 0–0.2)
IMM GRANULOCYTES NFR BLD AUTO: 1 % (ref 0–2)
INR PPP: 1.08 (ref 0.85–1.19)
KETONES UR STRIP-MCNC: ABNORMAL MG/DL
LACTATE SERPL-SCNC: 0.5 MMOL/L (ref 0.5–2)
LEUKOCYTE ESTERASE UR QL STRIP: ABNORMAL
LIPASE SERPL-CCNC: 44 U/L (ref 11–82)
LYMPHOCYTES # BLD AUTO: 4.52 THOUSANDS/ÂΜL (ref 0.6–4.47)
LYMPHOCYTES NFR BLD AUTO: 32 % (ref 14–44)
MAGNESIUM SERPL-MCNC: 2 MG/DL (ref 1.9–2.7)
MCH RBC QN AUTO: 24.7 PG (ref 26.8–34.3)
MCHC RBC AUTO-ENTMCNC: 32 G/DL (ref 31.4–37.4)
MCV RBC AUTO: 77 FL (ref 82–98)
MONOCYTES # BLD AUTO: 0.75 THOUSAND/ÂΜL (ref 0.17–1.22)
MONOCYTES NFR BLD AUTO: 5 % (ref 4–12)
MUCOUS THREADS UR QL AUTO: ABNORMAL
NEUTROPHILS # BLD AUTO: 8.4 THOUSANDS/ÂΜL (ref 1.85–7.62)
NEUTS SEG NFR BLD AUTO: 61 % (ref 43–75)
NITRITE UR QL STRIP: NEGATIVE
NON-SQ EPI CELLS URNS QL MICRO: ABNORMAL /HPF
NRBC BLD AUTO-RTO: 0 /100 WBCS
PH UR STRIP.AUTO: 6 [PH]
PLATELET # BLD AUTO: 265 THOUSANDS/UL (ref 149–390)
PMV BLD AUTO: 9.6 FL (ref 8.9–12.7)
POTASSIUM SERPL-SCNC: 3.6 MMOL/L (ref 3.5–5.3)
PROCALCITONIN SERPL-MCNC: <0.05 NG/ML
PROT SERPL-MCNC: 7.7 G/DL (ref 6.4–8.4)
PROT UR STRIP-MCNC: ABNORMAL MG/DL
PROTHROMBIN TIME: 14.5 SECONDS (ref 12.3–15)
RBC # BLD AUTO: 5.39 MILLION/UL (ref 3.81–5.12)
RBC #/AREA URNS AUTO: ABNORMAL /HPF
SODIUM SERPL-SCNC: 140 MMOL/L (ref 135–147)
SP GR UR STRIP.AUTO: >=1.03 (ref 1–1.03)
UROBILINOGEN UR STRIP-ACNC: 2 MG/DL
WBC # BLD AUTO: 13.95 THOUSAND/UL (ref 4.31–10.16)
WBC #/AREA URNS AUTO: ABNORMAL /HPF

## 2024-12-11 PROCEDURE — 83735 ASSAY OF MAGNESIUM: CPT

## 2024-12-11 PROCEDURE — 74177 CT ABD & PELVIS W/CONTRAST: CPT

## 2024-12-11 PROCEDURE — 80053 COMPREHEN METABOLIC PANEL: CPT

## 2024-12-11 PROCEDURE — 93005 ELECTROCARDIOGRAM TRACING: CPT

## 2024-12-11 PROCEDURE — 84145 PROCALCITONIN (PCT): CPT

## 2024-12-11 PROCEDURE — 85610 PROTHROMBIN TIME: CPT

## 2024-12-11 PROCEDURE — 87086 URINE CULTURE/COLONY COUNT: CPT

## 2024-12-11 PROCEDURE — 83605 ASSAY OF LACTIC ACID: CPT

## 2024-12-11 PROCEDURE — 83690 ASSAY OF LIPASE: CPT

## 2024-12-11 PROCEDURE — 96367 TX/PROPH/DG ADDL SEQ IV INF: CPT

## 2024-12-11 PROCEDURE — 87077 CULTURE AEROBIC IDENTIFY: CPT

## 2024-12-11 PROCEDURE — 87040 BLOOD CULTURE FOR BACTERIA: CPT

## 2024-12-11 PROCEDURE — 96375 TX/PRO/DX INJ NEW DRUG ADDON: CPT

## 2024-12-11 PROCEDURE — 81025 URINE PREGNANCY TEST: CPT

## 2024-12-11 PROCEDURE — 96361 HYDRATE IV INFUSION ADD-ON: CPT

## 2024-12-11 PROCEDURE — 96365 THER/PROPH/DIAG IV INF INIT: CPT

## 2024-12-11 PROCEDURE — 85025 COMPLETE CBC W/AUTO DIFF WBC: CPT

## 2024-12-11 PROCEDURE — 85730 THROMBOPLASTIN TIME PARTIAL: CPT

## 2024-12-11 PROCEDURE — 36415 COLL VENOUS BLD VENIPUNCTURE: CPT

## 2024-12-11 PROCEDURE — 99284 EMERGENCY DEPT VISIT MOD MDM: CPT

## 2024-12-11 PROCEDURE — 99285 EMERGENCY DEPT VISIT HI MDM: CPT

## 2024-12-11 PROCEDURE — 81001 URINALYSIS AUTO W/SCOPE: CPT

## 2024-12-11 RX ORDER — PANTOPRAZOLE SODIUM 40 MG/1
40 TABLET, DELAYED RELEASE ORAL DAILY
Qty: 14 TABLET | Refills: 0 | Status: SHIPPED | OUTPATIENT
Start: 2024-12-11 | End: 2024-12-25

## 2024-12-11 RX ORDER — CEFTRIAXONE 1 G/50ML
1000 INJECTION, SOLUTION INTRAVENOUS ONCE
Status: COMPLETED | OUTPATIENT
Start: 2024-12-11 | End: 2024-12-11

## 2024-12-11 RX ORDER — PANTOPRAZOLE SODIUM 40 MG/10ML
40 INJECTION, POWDER, LYOPHILIZED, FOR SOLUTION INTRAVENOUS ONCE
Status: COMPLETED | OUTPATIENT
Start: 2024-12-11 | End: 2024-12-11

## 2024-12-11 RX ORDER — ONDANSETRON 4 MG/1
4 TABLET, ORALLY DISINTEGRATING ORAL EVERY 6 HOURS PRN
Qty: 6 TABLET | Refills: 0 | Status: SHIPPED | OUTPATIENT
Start: 2024-12-11

## 2024-12-11 RX ORDER — ONDANSETRON 2 MG/ML
4 INJECTION INTRAMUSCULAR; INTRAVENOUS ONCE
Status: COMPLETED | OUTPATIENT
Start: 2024-12-11 | End: 2024-12-11

## 2024-12-11 RX ORDER — ACETAMINOPHEN 10 MG/ML
1000 INJECTION, SOLUTION INTRAVENOUS ONCE
Status: COMPLETED | OUTPATIENT
Start: 2024-12-11 | End: 2024-12-11

## 2024-12-11 RX ORDER — CEFPODOXIME PROXETIL 100 MG/1
100 TABLET, FILM COATED ORAL 2 TIMES DAILY
Qty: 14 TABLET | Refills: 0 | Status: SHIPPED | OUTPATIENT
Start: 2024-12-11 | End: 2024-12-18

## 2024-12-11 RX ADMIN — ONDANSETRON 4 MG: 2 INJECTION, SOLUTION INTRAMUSCULAR; INTRAVENOUS at 19:16

## 2024-12-11 RX ADMIN — IOHEXOL 50 ML: 350 INJECTION, SOLUTION INTRAVENOUS at 20:25

## 2024-12-11 RX ADMIN — SODIUM CHLORIDE 1000 ML: 0.9 INJECTION, SOLUTION INTRAVENOUS at 22:59

## 2024-12-11 RX ADMIN — CEFTRIAXONE 1000 MG: 1 INJECTION, SOLUTION INTRAVENOUS at 22:08

## 2024-12-11 RX ADMIN — SODIUM CHLORIDE 1000 ML: 0.9 INJECTION, SOLUTION INTRAVENOUS at 19:22

## 2024-12-11 RX ADMIN — PANTOPRAZOLE SODIUM 40 MG: 40 INJECTION, POWDER, FOR SOLUTION INTRAVENOUS at 19:17

## 2024-12-11 RX ADMIN — ACETAMINOPHEN 1000 MG: 10 INJECTION INTRAVENOUS at 19:16

## 2024-12-12 LAB
ATRIAL RATE: 80 BPM
P AXIS: 90 DEGREES
PR INTERVAL: 150 MS
QRS AXIS: 70 DEGREES
QRSD INTERVAL: 96 MS
QT INTERVAL: 392 MS
QTC INTERVAL: 452 MS
T WAVE AXIS: 66 DEGREES
VENTRICULAR RATE: 80 BPM

## 2024-12-12 PROCEDURE — 93010 ELECTROCARDIOGRAM REPORT: CPT | Performed by: INTERNAL MEDICINE

## 2024-12-12 NOTE — DISCHARGE INSTRUCTIONS
Please follow-up with your primary care provider in several days to ensure that your symptoms are improving.  Please take the antibiotic that has been sent to your pharmacy.  Additionally, a referral to gastroenterology has been sent for you.  As well as Protonix and Zofran for your epigastric pain and nausea.    Please return to the emergency department if you change your mind about being admitted and/or if you are experiencing worsening fever, chills, abdominal pain, nausea, vomiting, worsening urinary symptoms, anything new or concerning.

## 2024-12-12 NOTE — ED PROVIDER NOTES
Time reflects when diagnosis was documented in both MDM as applicable and the Disposition within this note       Time User Action Codes Description Comment    12/11/2024 11:35 PM Grygiel, Anel Add [R11.2] Nausea & vomiting     12/11/2024 11:35 PM Grygiel, Anel Add [R10.13] Epigastric pain     12/11/2024 11:35 PM Grygiel, Anel Add [N39.0] UTI (urinary tract infection)     12/11/2024 11:36 PM Grygiel, Anel Modify [R11.2] Nausea & vomiting     12/11/2024 11:36 PM Grygiel, Anel Modify [N39.0] UTI (urinary tract infection)     12/11/2024 11:46 PM Grygiel, Anel Add [E27.49] Adrenal calcification (HCC)           ED Disposition       ED Disposition   Discharge    Condition   Stable    Date/Time   Wed Dec 11, 2024 11:35 PM    Comment   Lisandra Babin discharge to home/self care.                   Assessment & Plan       Medical Decision Making  The patient presents with nausea, vomiting, and epigastric pain. Differential diagnoses includes, but is not limited to, gastritis, peptic ulcer disease, gastroenteritis, biliary colic, pancreatitis, electrolyte imbalances, etc. Will obtain labs and imaging.     The patient meets SIRS criteria with WBC 13.95 and tachycardia. Urinalysis findings are suggestive of a urinary tract infection. Given the recent use of Keflex, cefpodoxime was selected as the antibiotic for treatment. Admission was offered due to the presence of SIRS criteria and recent antibiotic use, however, the patient declined admission and opted for outpatient management.    Detailed return precautions, including the need to seek immediate care for worsening symptoms such as persistent fever, increasing pain, etc were thoroughly discussed. Emphasized following up with PCP regarding labs and imaging results. The patient expressed understanding and agreement with the plan. Discharged in no acute distress and well appearing.    Amount and/or Complexity of Data Reviewed  Labs: ordered.  Radiology:  ordered.    Risk  Prescription drug management.        ED Course as of 12/15/24 1546   Wed Dec 11, 2024   2128 Finished cephalexin 11/22 x 7 days   2136 Pulse: 100  RN reports patient was moving in the room (going to the restroom, was attached)   2256 cefpodoxime   2302 Offered admission, patient does not want to be admitted       Medications   sodium chloride 0.9 % bolus 1,000 mL (0 mL Intravenous Stopped 12/11/24 2022)   pantoprazole (PROTONIX) injection 40 mg (40 mg Intravenous Given 12/11/24 1917)   acetaminophen (Ofirmev) injection 1,000 mg (0 mg Intravenous Stopped 12/11/24 1959)   ondansetron (ZOFRAN) injection 4 mg (4 mg Intravenous Given 12/11/24 1916)   iohexol (OMNIPAQUE) 350 MG/ML injection (MULTI-DOSE) 50 mL (50 mL Intravenous Given 12/11/24 2025)   cefTRIAXone (ROCEPHIN) IVPB (premix in dextrose) 1,000 mg 50 mL (0 mg Intravenous Stopped 12/11/24 2238)   sodium chloride 0.9 % bolus 1,000 mL (0 mL Intravenous Stopped 12/11/24 2347)       ED Risk Strat Scores      HEART Risk Score      Flowsheet Row Most Recent Value   Heart Score Risk Calculator    History 0 Filed at: 12/11/2024 1900   ECG 0 Filed at: 12/11/2024 1900   Age 0 Filed at: 12/11/2024 1900   Risk Factors 0 Filed at: 12/11/2024 1900   Troponin --   HEART Score --                            SBIRT 20yo+      Flowsheet Row Most Recent Value   Initial Alcohol Screen: US AUDIT-C     1. How often do you have a drink containing alcohol? 0 Filed at: 12/11/2024 1846   2. How many drinks containing alcohol do you have on a typical day you are drinking?  0 Filed at: 12/11/2024 1846   3a. Male UNDER 65: How often do you have five or more drinks on one occasion? 0 Filed at: 12/11/2024 1846   3b. FEMALE Any Age, or MALE 65+: How often do you have 4 or more drinks on one occassion? 0 Filed at: 12/11/2024 1846   Audit-C Score 0 Filed at: 12/11/2024 8361   BASILIO: How many times in the past year have you...    Used an illegal drug or used a prescription  "medication for non-medical reasons? Never Filed at: 12/11/2024 1846                            History of Present Illness       Chief Complaint   Patient presents with    Vomiting     Pt reports that she is \"not digesting her food\" since yesterday. Is experiencing abdominal pain, and vomiting. Has a history of this happening multiple times in the past. Pepcid gives no relief.        History reviewed. No pertinent past medical history.   History reviewed. No pertinent surgical history.   Family History   Problem Relation Age of Onset    Diabetes Mother     Heart Valve Disease Father     No Known Problems Sister     No Known Problems Sister     No Known Problems Sister     No Known Problems Brother     No Known Problems Son     Cancer Maternal Grandmother     Heart disease Maternal Grandfather     Dementia Paternal Grandmother       Social History     Tobacco Use    Smoking status: Never    Smokeless tobacco: Never   Vaping Use    Vaping status: Never Used   Substance Use Topics    Alcohol use: Not Currently    Drug use: Never      E-Cigarette/Vaping    E-Cigarette Use Never User       E-Cigarette/Vaping Substances    Nicotine No     THC No     CBD No     Flavoring No     Other No     Unknown No       I have reviewed and agree with the history as documented.     The patient is a 29-year-old female with past medical history of MDD, anxiety, headaches, who is presenting to the emergency department for evaluation of epigastric pain with associated nausea with associated vomiting. She reports that symptoms began yesterday without any episodes of emesis initially, but she experienced several episodes of vomiting today, the last occurring at 1 PM. The patient denies diarrhea but notes a history of constipation. She denies blood in her vomit or stool. She denies consuming any new or raw foods or starting new medications. Denies flank pain, dysuria, vaginal bleeding, vaginal discharge. She reports starting a 7-day course of " "Keflex on 11/22 for a \"toe infection.\"      Vomiting  Associated symptoms: abdominal pain    Associated symptoms: no arthralgias, no chills, no cough and no fever        Review of Systems   Constitutional:  Negative for chills and fever.   HENT:  Negative for congestion and rhinorrhea.    Eyes:  Negative for pain and visual disturbance.   Respiratory:  Negative for cough and shortness of breath.    Cardiovascular:  Negative for chest pain and palpitations.   Gastrointestinal:  Positive for abdominal pain, nausea and vomiting.   Genitourinary:  Negative for dysuria, flank pain, urgency and vaginal bleeding.   Musculoskeletal:  Negative for arthralgias and back pain.   Skin:  Negative for color change and rash.   Neurological:  Negative for seizures and syncope.   All other systems reviewed and are negative.          Objective       ED Triage Vitals   Temperature Pulse Blood Pressure Respirations SpO2 Patient Position - Orthostatic VS   12/11/24 1845 12/11/24 1846 12/11/24 1846 12/11/24 1845 12/11/24 1845 12/11/24 1846   97.8 °F (36.6 °C) 100 116/79 18 98 % Sitting      Temp Source Heart Rate Source BP Location FiO2 (%) Pain Score    12/11/24 1845 12/11/24 1845 12/11/24 1846 -- 12/11/24 1924    Temporal Monitor Right arm  5      Vitals      Date and Time Temp Pulse SpO2 Resp BP Pain Score FACES Pain Rating User   12/11/24 2330 -- 80 99 % 16 107/63 -- --    12/11/24 2253 -- -- -- -- 109/69 -- -- OG   12/11/24 2230 -- 88 97 % 18 109/69 -- -- TH   12/11/24 2136 -- 77 98 % 17 93/57 -- -- BA   12/11/24 1924 -- -- -- -- -- 5 -- TH   12/11/24 1846 -- 100 -- -- 116/79 -- -- HR   12/11/24 1845 97.8 °F (36.6 °C) -- 98 % 18 -- -- -- HR            Physical Exam  Vitals and nursing note reviewed.   Constitutional:       General: She is not in acute distress.     Appearance: Normal appearance. She is well-developed. She is not ill-appearing.   HENT:      Head: Normocephalic and atraumatic.      Nose: Nose normal.   Eyes:      " Conjunctiva/sclera: Conjunctivae normal.   Cardiovascular:      Rate and Rhythm: Normal rate and regular rhythm.   Pulmonary:      Effort: Pulmonary effort is normal. No respiratory distress.      Breath sounds: Normal breath sounds.   Abdominal:      General: Abdomen is flat.      Palpations: Abdomen is soft.      Tenderness: There is abdominal tenderness (mild, epigastric). There is no right CVA tenderness, left CVA tenderness, guarding or rebound.   Musculoskeletal:         General: No swelling.      Cervical back: Neck supple.   Skin:     General: Skin is warm and dry.      Capillary Refill: Capillary refill takes less than 2 seconds.   Neurological:      Mental Status: She is alert.   Psychiatric:         Mood and Affect: Mood normal.         Results Reviewed       Procedure Component Value Units Date/Time    Blood culture #2 [[id number]] Collected: 12/11/24 2310    Lab Status: Preliminary result Specimen: Blood from Hand, Right Updated: 12/15/24 0701     Blood Culture No Growth at 72 hrs.    Blood culture #1 [[id number]] Collected: 12/11/24 2310    Lab Status: Preliminary result Specimen: Blood from Arm, Right Updated: 12/15/24 0701     Blood Culture No Growth at 72 hrs.    Urine culture [[id number]]  (Abnormal)  (Susceptibility) Collected: 12/11/24 1922    Lab Status: Final result Specimen: Urine, Clean Catch Updated: 12/13/24 0931     Urine Culture >100,000 cfu/ml Lactobacillus species      10,000-19,000 cfu/ml    Susceptibility       Lactobacillus species (1)       Antibiotic Interpretation Microscan Method Status    ZID Performed  Yes SHARMILA Final                       Procalcitonin [[id number]]  (Normal) Collected: 12/11/24 1922    Lab Status: Final result Specimen: Blood from Arm, Right Updated: 12/11/24 2331     Procalcitonin <0.05 ng/ml     Protime-INR [[id number]]  (Normal) Collected: 12/11/24 2310    Lab Status: Final result Specimen: Blood from Arm, Right Updated: 12/11/24 2329     Protime 14.5 seconds       INR 1.08    Narrative:      INR Therapeutic Range    Indication                                             INR Range      Atrial Fibrillation                                               2.0-3.0  Hypercoagulable State                                    2.0.2.3  Left Ventricular Asist Device                            2.0-3.0  Mechanical Heart Valve                                  -    Aortic(with afib, MI, embolism, HF, LA enlargement,    and/or coagulopathy)                                     2.0-3.0 (2.5-3.5)     Mitral                                                             2.5-3.5  Prosthetic/Bioprosthetic Heart Valve               2.0-3.0  Venous thromboembolism (VTE: VT, PE        2.0-3.0    APTT [[id number]]  (Normal) Collected: 12/11/24 2310    Lab Status: Final result Specimen: Blood from Arm, Right Updated: 12/11/24 2329     PTT 29 seconds     Urine Microscopic [[id number]]  (Abnormal) Collected: 12/11/24 1922    Lab Status: Final result Specimen: Urine, Clean Catch Updated: 12/11/24 2010     RBC, UA 4-10 /hpf      WBC, UA 20-30 /hpf      Epithelial Cells Innumerable /hpf      Bacteria, UA Innumerable /hpf      MUCUS THREADS Innumerable     Hyaline Casts, UA 1-2 /lpf      Granular Casts, UA 3-5    UA w Reflex to Microscopic w Reflex to Culture [[id number]]  (Abnormal) Collected: 12/11/24 1922    Lab Status: Final result Specimen: Urine, Clean Catch Updated: 12/11/24 2009     Color, UA Yellow     Clarity, UA Slightly Cloudy     Specific Gravity, UA >=1.030     pH, UA 6.0     Leukocytes, UA Large     Nitrite, UA Negative     Protein, UA 30 (1+) mg/dl      Glucose, UA Negative mg/dl      Ketones, UA Trace mg/dl      Urobilinogen, UA 2.0 mg/dl      Bilirubin, UA Negative     Occult Blood, UA Negative    Comprehensive metabolic panel [[id number]] Collected: 12/11/24 1922    Lab Status: Final result Specimen: Blood from Arm, Right Updated: 12/11/24 1950     Sodium 140 mmol/L      Potassium 3.6 mmol/L       Chloride 105 mmol/L      CO2 28 mmol/L      ANION GAP 7 mmol/L      BUN 16 mg/dL      Creatinine 0.70 mg/dL      Glucose 80 mg/dL      Calcium 9.4 mg/dL      AST 14 U/L      ALT 10 U/L      Alkaline Phosphatase 69 U/L      Total Protein 7.7 g/dL      Albumin 4.9 g/dL      Total Bilirubin 0.84 mg/dL      eGFR 117 ml/min/1.73sq m     Narrative:      National Kid[employer] Foundation guidelines for Chronic Kidney Disease (CKD):     Stage 1 with normal or high GFR (GFR > 90 mL/min/1.73 square meters)    Stage 2 Mild CKD (GFR = 60-89 mL/min/1.73 square meters)    Stage 3A Moderate CKD (GFR = 45-59 mL/min/1.73 square meters)    Stage 3B Moderate CKD (GFR = 30-44 mL/min/1.73 square meters)    Stage 4 Severe CKD (GFR = 15-29 mL/min/1.73 square meters)    Stage 5 End Stage CKD (GFR <15 mL/min/1.73 square meters)  Note: GFR calculation is accurate only with a steady state creatinine    Lipase [[id number]]  (Normal) Collected: 12/11/24 1922    Lab Status: Final result Specimen: Blood from Arm, Right Updated: 12/11/24 1950     Lipase 44 u/L     Magnesium [[id number]]  (Normal) Collected: 12/11/24 1922    Lab Status: Final result Specimen: Blood from Arm, Right Updated: 12/11/24 1950     Magnesium 2.0 mg/dL     Lactic acid, plasma (w/reflex if result > 2.0) [[id number]]  (Normal) Collected: 12/11/24 1922    Lab Status: Final result Specimen: Blood from Arm, Right Updated: 12/11/24 1949     LACTIC ACID 0.5 mmol/L     Narrative:      Result may be elevated if tourniquet was used during collection.    CBC and differential [[id number]]  (Abnormal) Collected: 12/11/24 1922    Lab Status: Final result Specimen: Blood from Arm, Right Updated: 12/11/24 1932     WBC 13.95 Thousand/uL      RBC 5.39 Million/uL      Hemoglobin 13.3 g/dL      Hematocrit 41.6 %      MCV 77 fL      MCH 24.7 pg      MCHC 32.0 g/dL      RDW 12.3 %      MPV 9.6 fL      Platelets 265 Thousands/uL      nRBC 0 /100 WBCs      Segmented % 61 %      Immature Grans % 1 %       Lymphocytes % 32 %      Monocytes % 5 %      Eosinophils Relative 1 %      Basophils Relative 0 %      Absolute Neutrophils 8.40 Thousands/µL      Absolute Immature Grans 0.07 Thousand/uL      Absolute Lymphocytes 4.52 Thousands/µL      Absolute Monocytes 0.75 Thousand/µL      Eosinophils Absolute 0.18 Thousand/µL      Basophils Absolute 0.03 Thousands/µL     POCT pregnancy, urine [[id number]]  (Normal) Collected: 12/11/24 1914    Lab Status: Final result Updated: 12/11/24 1919     EXT Preg Test, Ur Negative     Control Valid            CT abdomen pelvis with contrast   Final Interpretation by Kyle Arcos DO (12/11 2054)   No CT evidence of acute findings.   Coarse right adrenal calcifications without discrete mass. This is presumed secondary to remote prior hemorrhage or granulomatous infection.            Workstation performed: [id number]             ECG 12 Lead Documentation Only    Date/Time: 12/11/2024 3:44 PM    Performed by: Anel Bess PA-C  Authorized by: Anel Bess PA-C    Indications / Diagnosis:  Sirs criteria  ECG reviewed by me, the ED Provider: yes (lex)    Patient location:  ED  Rate:     ECG rate:  80    ECG rate assessment: normal    Rhythm:     Rhythm: sinus rhythm        ED Medication and Procedure Management   Prior to Admission Medications   Prescriptions Last Dose Informant Patient Reported? Taking?   Adapalene 0.3 % gel   Yes No   Sig: Apply topically daily at bedtime   Patient not taking: Reported on 8/30/2024   amitriptyline (ELAVIL) 50 mg tablet   No No   Sig: Take 1 tablet (50 mg total) by mouth daily at bedtime   famotidine (PEPCID) 20 mg tablet   Yes No   Sig: Take 40 mg by mouth daily as needed for heartburn   hydroquinone 4 % cream   Yes No   Sig: Apply topically daily at bedtime   Patient not taking: Reported on 8/30/2024   montelukast (SINGULAIR) 10 mg tablet   No No   Sig: Take 1 tablet (10 mg total) by mouth daily at bedtime   Patient not taking: Reported on  8/30/2024   spironolactone (ALDACTONE) 50 mg tablet   Yes No   Sig: Take 50 mg by mouth daily 100mg once daily      Facility-Administered Medications: None     Discharge Medication List as of 12/11/2024 11:40 PM        START taking these medications    Details   cefpodoxime (VANTIN) 100 mg tablet Take 1 tablet (100 mg total) by mouth 2 (two) times a day for 7 days, Starting Wed 12/11/2024, Until Wed 12/18/2024, Normal      ondansetron (ZOFRAN-ODT) 4 mg disintegrating tablet Take 1 tablet (4 mg total) by mouth every 6 (six) hours as needed for nausea or vomiting for up to 6 doses, Starting Wed 12/11/2024, Normal      pantoprazole (PROTONIX) 40 mg tablet Take 1 tablet (40 mg total) by mouth daily for 14 days, Starting Wed 12/11/2024, Until Wed 12/25/2024, Normal           CONTINUE these medications which have NOT CHANGED    Details   Adapalene 0.3 % gel Apply topically daily at bedtime, Starting Mon 8/5/2024, Historical Med      amitriptyline (ELAVIL) 50 mg tablet Take 1 tablet (50 mg total) by mouth daily at bedtime, Starting Fri 8/16/2024, Normal      famotidine (PEPCID) 20 mg tablet Take 40 mg by mouth daily as needed for heartburn, Historical Med      hydroquinone 4 % cream Apply topically daily at bedtime, Starting Wed 3/20/2024, Historical Med      montelukast (SINGULAIR) 10 mg tablet Take 1 tablet (10 mg total) by mouth daily at bedtime, Starting Thu 6/6/2024, Normal      spironolactone (ALDACTONE) 50 mg tablet Take 50 mg by mouth daily 100mg once daily, Historical Med             ED SEPSIS DOCUMENTATION   Time reflects when diagnosis was documented in both MDM as applicable and the Disposition within this note       Time User Action Codes Description Comment    12/11/2024 11:35 PM Anel Bess [R11.2] Nausea & vomiting     12/11/2024 11:35 PM Anel Bess Add [R10.13] Epigastric pain     12/11/2024 11:35 PM Anel Bess [N39.0] UTI (urinary tract infection)     12/11/2024 11:36 PM Anel Bess  Modify [R11.2] Nausea & vomiting     12/11/2024 11:36 PM Anel Bess Modify [N39.0] UTI (urinary tract infection)     12/11/2024 11:46 PM Anel Bess Add [E27.49] Adrenal calcification (HCC)                  Anel Bess PA-C  12/15/24 6241

## 2024-12-13 ENCOUNTER — RESULTS FOLLOW-UP (OUTPATIENT)
Dept: EMERGENCY DEPT | Facility: HOSPITAL | Age: 29
End: 2024-12-13

## 2024-12-13 LAB
BACTERIA UR CULT: ABNORMAL
BACTERIA UR CULT: ABNORMAL

## 2024-12-15 LAB
BACTERIA BLD CULT: NORMAL
BACTERIA BLD CULT: NORMAL

## 2024-12-17 LAB
BACTERIA BLD CULT: NORMAL
BACTERIA BLD CULT: NORMAL

## 2024-12-19 ENCOUNTER — TELEPHONE (OUTPATIENT)
Dept: GASTROENTEROLOGY | Facility: CLINIC | Age: 29
End: 2024-12-19

## 2024-12-19 ENCOUNTER — CONSULT (OUTPATIENT)
Dept: GASTROENTEROLOGY | Facility: CLINIC | Age: 29
End: 2024-12-19
Payer: COMMERCIAL

## 2024-12-19 VITALS
HEIGHT: 63 IN | DIASTOLIC BLOOD PRESSURE: 66 MMHG | SYSTOLIC BLOOD PRESSURE: 98 MMHG | BODY MASS INDEX: 26.93 KG/M2 | WEIGHT: 152 LBS

## 2024-12-19 DIAGNOSIS — Z80.0 FAMILY HISTORY OF COLON CANCER: ICD-10-CM

## 2024-12-19 DIAGNOSIS — R11.2 NAUSEA AND VOMITING, UNSPECIFIED VOMITING TYPE: Primary | ICD-10-CM

## 2024-12-19 DIAGNOSIS — R10.13 EPIGASTRIC PAIN: ICD-10-CM

## 2024-12-19 DIAGNOSIS — R19.4 CHANGE IN BOWEL HABIT: ICD-10-CM

## 2024-12-19 PROCEDURE — 99204 OFFICE O/P NEW MOD 45 MIN: CPT | Performed by: STUDENT IN AN ORGANIZED HEALTH CARE EDUCATION/TRAINING PROGRAM

## 2024-12-19 RX ORDER — POLYETHYLENE GLYCOL 3350, SODIUM SULFATE ANHYDROUS, SODIUM BICARBONATE, SODIUM CHLORIDE, POTASSIUM CHLORIDE 236; 22.74; 6.74; 5.86; 2.97 G/4L; G/4L; G/4L; G/4L; G/4L
4000 POWDER, FOR SOLUTION ORAL ONCE
Qty: 4000 ML | Refills: 0 | Status: SHIPPED | OUTPATIENT
Start: 2024-12-19 | End: 2024-12-19

## 2024-12-19 RX ORDER — POLYETHYLENE GLYCOL 3350 17 G/17G
17 POWDER, FOR SOLUTION ORAL DAILY
Qty: 510 G | Refills: 0 | Status: SHIPPED | OUTPATIENT
Start: 2024-12-19 | End: 2025-01-18

## 2024-12-19 RX ORDER — SODIUM CHLORIDE, SODIUM LACTATE, POTASSIUM CHLORIDE, CALCIUM CHLORIDE 600; 310; 30; 20 MG/100ML; MG/100ML; MG/100ML; MG/100ML
125 INJECTION, SOLUTION INTRAVENOUS CONTINUOUS
OUTPATIENT
Start: 2024-12-19

## 2024-12-19 NOTE — PROGRESS NOTES
Name: Lisandra Babin      : 1995      MRN: [id number]  Encounter Provider: Dhiraj Tello DO  Encounter Date: 2024   Encounter department: [facility]  :  Assessment & Plan  Epigastric pain  There is consideration for upper GI symptoms including epigastric pain, nausea vomiting and vomiting for from the previous day.  This could be idiopathic gastroparesis, dyspepsia, reflux, cyclic vomiting syndrome.  We should get an upper endoscopy to assess baseline structural/mucosal status and ensure no peptic ulcer disease, esophagitis or gastritis and no obstruction.  If EGD is unremarkable would then start PPI and get a gastric emptying study to rule out gastroparesis.  Interestingly she did take a single dose of Reglan previously and did have some relief with this.  Orders:    Ambulatory Referral to Gastroenterology    EGD; Future    Nausea and vomiting, unspecified vomiting type  As above, possible gastroparesis, reflux, cyclic vomiting syndrome.  EGD as above and then will determine need for gastric emptying study.  Orders:    EGD; Future    Change in bowel habit  She has had a recent change in bowel habits, that she used to go on a regular basis but now has worsening constipation requiring more frequent use of over-the-counter medications including laxatives.  She does have a family history of colon cancer in her grandmother.  We will proceed with colonoscopy to ensure no premalignant or malignant lesions that are contributing to her changes in bowel patterns.   Orders:    Colonoscopy; Future    polyethylene glycol (Golytely) 4000 mL solution; Take 4,000 mL by mouth once for 1 dose Take 4000 mL by mouth once for 1 dose. Use as directed    polyethylene glycol (MIRALAX) 17 g packet; Take 17 g by mouth daily    Family history of colon cancer  Due to family history of colon cancer and recent changes in bowel habits, will proceed with colonsocopy.  Orders:     Colonoscopy; Future    polyethylene glycol (Golytely) 4000 mL solution; Take 4,000 mL by mouth once for 1 dose Take 4000 mL by mouth once for 1 dose. Use as directed        History of Present Illness   HPI  Lisandra Babin is a 29 y.o. female who presents for evaluation of chronic nausea, vomiting and change in bowel habits including constipation. For more than one year, she gets intermittent episodes of nausea, vomiting and epigastric pain. During these episodes it feels that her food from the previous day is vomited up. She also has developed worsening reflux symptoms and has been on chronic famotidine which is not working as well as it used to. She does have chronic headaches, but no clear correlation with her headache episodes. Also for the past year, her bowel movements have changed and she has developed significant constipation which will require intermittent stool softeners and laxatives. There is no rectal bleeding. Her grandmother had colon cancer.    History obtained from: patient    Review of Systems  Current Outpatient Medications on File Prior to Visit   Medication Sig Dispense Refill    amitriptyline (ELAVIL) 50 mg tablet Take 1 tablet (50 mg total) by mouth daily at bedtime 30 tablet 5    ondansetron (ZOFRAN-ODT) 4 mg disintegrating tablet Take 1 tablet (4 mg total) by mouth every 6 (six) hours as needed for nausea or vomiting for up to 6 doses 6 tablet 0    pantoprazole (PROTONIX) 40 mg tablet Take 1 tablet (40 mg total) by mouth daily for 14 days 14 tablet 0    spironolactone (ALDACTONE) 100 mg tablet Take 100 mg by mouth daily 100mg once daily      Adapalene 0.3 % gel Apply topically daily at bedtime (Patient not taking: Reported on 2024)      [] cefpodoxime (VANTIN) 100 mg tablet Take 1 tablet (100 mg total) by mouth 2 (two) times a day for 7 days 14 tablet 0    famotidine (PEPCID) 20 mg tablet Take 40 mg by mouth daily as needed for heartburn (Patient not taking: Reported on  "12/19/2024)      hydroquinone 4 % cream Apply topically daily at bedtime (Patient not taking: Reported on 8/30/2024)      montelukast (SINGULAIR) 10 mg tablet Take 1 tablet (10 mg total) by mouth daily at bedtime (Patient not taking: Reported on 8/30/2024) 30 tablet 5     No current facility-administered medications on file prior to visit.      Social History     Tobacco Use    Smoking status: Never    Smokeless tobacco: Never   Vaping Use    Vaping status: Never Used   Substance and Sexual Activity    Alcohol use: Not Currently    Drug use: Never    Sexual activity: Yes     Partners: Male     Birth control/protection: None        Objective   BP 98/66   Ht 5' 3\" (1.6 m)   Wt 68.9 kg (152 lb)   BMI 26.93 kg/m²      Physical Exam    General Appearance: NAD, cooperative, alert  Eyes: Anicteric  GI:  Soft, non-tender, non-distended; normal bowel sounds; no masses, no organomegaly   Rectal: Deferred  Musculoskeletal: No edema.  Skin:     No jaundice    "

## 2024-12-19 NOTE — TELEPHONE ENCOUNTER
Scheduled date of combo (as of today): 1/23/25  Physician performing combo: ND  Location of combo: [facility]  Bowel prep reviewed with patient: Golytely  Instructions reviewed with patient by: SHARON  Clearances: N

## 2025-01-09 ENCOUNTER — ANESTHESIA EVENT (OUTPATIENT)
Dept: ANESTHESIOLOGY | Facility: AMBULATORY SURGERY CENTER | Age: 30
End: 2025-01-09

## 2025-01-09 ENCOUNTER — ANESTHESIA (OUTPATIENT)
Dept: ANESTHESIOLOGY | Facility: AMBULATORY SURGERY CENTER | Age: 30
End: 2025-01-09

## 2025-01-23 ENCOUNTER — HOSPITAL ENCOUNTER (OUTPATIENT)
Dept: GASTROENTEROLOGY | Facility: AMBULATORY SURGERY CENTER | Age: 30
Discharge: HOME | End: 2025-01-23
Attending: STUDENT IN AN ORGANIZED HEALTH CARE EDUCATION/TRAINING PROGRAM
Payer: COMMERCIAL

## 2025-01-23 ENCOUNTER — ANESTHESIA (OUTPATIENT)
Dept: GASTROENTEROLOGY | Facility: AMBULATORY SURGERY CENTER | Age: 30
End: 2025-01-23

## 2025-01-23 ENCOUNTER — ANESTHESIA EVENT (OUTPATIENT)
Dept: GASTROENTEROLOGY | Facility: AMBULATORY SURGERY CENTER | Age: 30
End: 2025-01-23

## 2025-01-23 VITALS
HEIGHT: 64 IN | TEMPERATURE: 98.7 F | HEART RATE: 78 BPM | SYSTOLIC BLOOD PRESSURE: 105 MMHG | BODY MASS INDEX: 26.29 KG/M2 | RESPIRATION RATE: 20 BRPM | OXYGEN SATURATION: 100 % | WEIGHT: 154 LBS | DIASTOLIC BLOOD PRESSURE: 70 MMHG

## 2025-01-23 DIAGNOSIS — R19.4 CHANGE IN BOWEL HABIT: ICD-10-CM

## 2025-01-23 DIAGNOSIS — Z80.0 FAMILY HISTORY OF COLON CANCER: ICD-10-CM

## 2025-01-23 DIAGNOSIS — R11.2 NAUSEA AND VOMITING, UNSPECIFIED VOMITING TYPE: ICD-10-CM

## 2025-01-23 DIAGNOSIS — R10.13 EPIGASTRIC PAIN: ICD-10-CM

## 2025-01-23 LAB
EXT PREGNANCY TEST URINE: NEGATIVE
EXT. CONTROL: NORMAL

## 2025-01-23 PROCEDURE — 88305 TISSUE EXAM BY PATHOLOGIST: CPT | Performed by: STUDENT IN AN ORGANIZED HEALTH CARE EDUCATION/TRAINING PROGRAM

## 2025-01-23 PROCEDURE — 43239 EGD BIOPSY SINGLE/MULTIPLE: CPT | Performed by: STUDENT IN AN ORGANIZED HEALTH CARE EDUCATION/TRAINING PROGRAM

## 2025-01-23 PROCEDURE — 45380 COLONOSCOPY AND BIOPSY: CPT | Performed by: STUDENT IN AN ORGANIZED HEALTH CARE EDUCATION/TRAINING PROGRAM

## 2025-01-23 RX ORDER — LIDOCAINE HYDROCHLORIDE 20 MG/ML
INJECTION, SOLUTION EPIDURAL; INFILTRATION; INTRACAUDAL; PERINEURAL AS NEEDED
Status: DISCONTINUED | OUTPATIENT
Start: 2025-01-23 | End: 2025-01-23

## 2025-01-23 RX ORDER — ESOMEPRAZOLE MAGNESIUM 40 MG/1
40 CAPSULE, DELAYED RELEASE ORAL DAILY
Qty: 60 CAPSULE | Refills: 0 | Status: SHIPPED | OUTPATIENT
Start: 2025-01-23 | End: 2025-01-24 | Stop reason: ALTCHOICE

## 2025-01-23 RX ORDER — SODIUM CHLORIDE, SODIUM LACTATE, POTASSIUM CHLORIDE, CALCIUM CHLORIDE 600; 310; 30; 20 MG/100ML; MG/100ML; MG/100ML; MG/100ML
125 INJECTION, SOLUTION INTRAVENOUS CONTINUOUS
Status: DISCONTINUED | OUTPATIENT
Start: 2025-01-23 | End: 2025-01-27 | Stop reason: HOSPADM

## 2025-01-23 RX ORDER — PROPOFOL 10 MG/ML
INJECTION, EMULSION INTRAVENOUS AS NEEDED
Status: DISCONTINUED | OUTPATIENT
Start: 2025-01-23 | End: 2025-01-23

## 2025-01-23 RX ORDER — SENNOSIDES A AND B 8.6 MG/1
2 TABLET, FILM COATED ORAL DAILY
COMMUNITY

## 2025-01-23 RX ORDER — SODIUM CHLORIDE, SODIUM LACTATE, POTASSIUM CHLORIDE, CALCIUM CHLORIDE 600; 310; 30; 20 MG/100ML; MG/100ML; MG/100ML; MG/100ML
INJECTION, SOLUTION INTRAVENOUS CONTINUOUS PRN
Status: DISCONTINUED | OUTPATIENT
Start: 2025-01-23 | End: 2025-01-23

## 2025-01-23 RX ADMIN — SODIUM CHLORIDE, SODIUM LACTATE, POTASSIUM CHLORIDE, CALCIUM CHLORIDE: 600; 310; 30; 20 INJECTION, SOLUTION INTRAVENOUS at 09:11

## 2025-01-23 RX ADMIN — PROPOFOL 100 MG: 10 INJECTION, EMULSION INTRAVENOUS at 09:28

## 2025-01-23 RX ADMIN — PROPOFOL 50 MG: 10 INJECTION, EMULSION INTRAVENOUS at 09:37

## 2025-01-23 RX ADMIN — PROPOFOL 150 MG: 10 INJECTION, EMULSION INTRAVENOUS at 09:27

## 2025-01-23 RX ADMIN — LIDOCAINE HYDROCHLORIDE 50 MG: 20 INJECTION, SOLUTION EPIDURAL; INFILTRATION; INTRACAUDAL; PERINEURAL at 09:27

## 2025-01-23 RX ADMIN — PROPOFOL 50 MG: 10 INJECTION, EMULSION INTRAVENOUS at 09:35

## 2025-01-23 RX ADMIN — PROPOFOL 50 MG: 10 INJECTION, EMULSION INTRAVENOUS at 09:32

## 2025-01-23 RX ADMIN — PROPOFOL 50 MG: 10 INJECTION, EMULSION INTRAVENOUS at 09:40

## 2025-01-23 RX ADMIN — SODIUM CHLORIDE, SODIUM LACTATE, POTASSIUM CHLORIDE, CALCIUM CHLORIDE 125 ML/HR: 600; 310; 30; 20 INJECTION, SOLUTION INTRAVENOUS at 08:54

## 2025-01-23 RX ADMIN — PROPOFOL 50 MG: 10 INJECTION, EMULSION INTRAVENOUS at 09:29

## 2025-01-23 NOTE — ANESTHESIA POSTPROCEDURE EVALUATION
Post-Op Assessment Note    CV Status:  Stable  Pain Score: 0    Pain management: adequate       Mental Status:  Alert and awake   Hydration Status:  Euvolemic   PONV Controlled:  Controlled   Airway Patency:  Patent     Post Op Vitals Reviewed: Yes    No anethesia notable event occurred.    Staff: Anesthesiologist           Last Filed PACU Vitals:  Vitals Value Taken Time   Temp     Pulse 78 01/23/25 1007   /70 01/23/25 1007   Resp 20 01/23/25 1007   SpO2 100 % 01/23/25 1007       Modified Alejandro:     Vitals Value Taken Time   Activity 2 01/23/25 1007   Respiration 2 01/23/25 1007   Circulation 2 01/23/25 1007   Consciousness 2 01/23/25 1007   Oxygen Saturation 2 01/23/25 [facility]     Modified Alejandro Score: 10

## 2025-01-23 NOTE — ANESTHESIA POSTPROCEDURE EVALUATION
Post-Op Assessment Note    CV Status:  Stable  Pain Score: 0    Pain management: adequate       Mental Status:  Alert and awake   Hydration Status:  Euvolemic   PONV Controlled:  Controlled   Airway Patency:  Patent     Post Op Vitals Reviewed: Yes    No anethesia notable event occurred.    Staff: Anesthesiologist, CRNA           Last Filed PACU Vitals:  Vitals Value Taken Time   Temp     Pulse 89 01/23/25 0946   BP 98/54 01/23/25 0946   Resp 20 01/23/25 0946   SpO2 97 % 01/23/25 0946       Modified Alejandro:     Vitals Value Taken Time   Activity 0 01/23/25 0946   Respiration 2 01/23/25 0946   Circulation 1 01/23/25 0946   Consciousness 0 01/23/25 0946   Oxygen Saturation 2 01/23/25 0946     Modified Alejandro Score: 5

## 2025-01-23 NOTE — ANESTHESIA PREPROCEDURE EVALUATION
Procedure:  COLONOSCOPY  EGD    Relevant Problems   NEURO/PSYCH   (+) Anxiety   (+) Chronic nonintractable headache   (+) Current mild episode of major depressive disorder without prior episode (HCC)        Physical Exam    Airway    Mallampati score: II  TM Distance: >3 FB  Neck ROM: full     Dental   No notable dental hx     Cardiovascular      Pulmonary      Other Findings  post-pubertal.      Anesthesia Plan  ASA Score- 2     Anesthesia Type- IV sedation with anesthesia with ASA Monitors.         Additional Monitors:     Airway Plan:            Plan Factors-Exercise tolerance (METS): >4 METS.    Chart reviewed.    Patient summary reviewed.    Patient is not a current smoker.              Induction- intravenous.    Postoperative Plan-         Informed Consent- Anesthetic plan and risks discussed with patient.  I personally reviewed this patient with the CRNA. Discussed and agreed on the Anesthesia Plan with the CRNA..      NPO Status:  No vitals data found for the desired time range.

## 2025-01-23 NOTE — H&P
History and Physical - [facility]  Lisandra Babin 29 y.o. female MRN: [id number]    HPI: Lisandra Babin is a 29 y.o. female who presents for EGD/colonoscopy    REVIEW OF SYSTEMS: Per the HPI, and otherwise unremarkable.    Historical Information   Past Medical History:   Diagnosis Date    GERD (gastroesophageal reflux disease)      History reviewed. No pertinent surgical history.  Social History   Social History     Substance and Sexual Activity   Alcohol Use Not Currently     Social History     Substance and Sexual Activity   Drug Use Never     Social History     Tobacco Use   Smoking Status Never   Smokeless Tobacco Never     Family History   Problem Relation Age of Onset    Diabetes Mother     Heart Valve Disease Father     Heart disease Father     Hypothyroidism Father     No Known Problems Sister     No Known Problems Sister     No Known Problems Sister     No Known Problems Brother     No Known Problems Son     Cancer Maternal Grandmother     Colon cancer Maternal Grandmother     Heart disease Maternal Grandfather     Dementia Paternal Grandmother        Meds/Allergies       Current Outpatient Medications:     amitriptyline (ELAVIL) 50 mg tablet    ondansetron (ZOFRAN-ODT) 4 mg disintegrating tablet    senna (SENOKOT) 8.6 MG tablet    spironolactone (ALDACTONE) 100 mg tablet    Adapalene 0.3 % gel    famotidine (PEPCID) 20 mg tablet    hydroquinone 4 % cream    montelukast (SINGULAIR) 10 mg tablet    pantoprazole (PROTONIX) 40 mg tablet    polyethylene glycol (Golytely) 4000 mL solution    polyethylene glycol (MIRALAX) 17 g packet    Current Facility-Administered Medications:     lactated ringers infusion, 125 mL/hr, Intravenous, Continuous, 125 mL/hr at 01/23/25 0854    Facility-Administered Medications Ordered in Other Encounters:     lactated ringers infusion, , Intravenous, Continuous PRN, New Bag at 01/23/25 0911    No Known Allergies    Objective     /73   Pulse 94   Temp 98.7  "°F (37.1 °C) (Temporal)   Resp 20   Ht 5' 4\" (1.626 m)   Wt 69.9 kg (154 lb)   LMP 01/14/2025   SpO2 97%   BMI 26.43 kg/m²     PHYSICAL EXAM    General Appearance: NAD, cooperative, alert  Eyes: Anicteric  GI:  Soft, non-tender, non-distended; normal bowel sounds; no masses, no organomegaly   Rectal: Deferred until procedure  Musculoskeletal: No edema.  Skin:  No jaundice    ASSESSMENT/PLAN:  This is a 29 y.o. female here for EGD/colonoscopy, and she is stable and optimized for her procedure.        "

## 2025-01-24 ENCOUNTER — RESULTS FOLLOW-UP (OUTPATIENT)
Dept: GASTROENTEROLOGY | Facility: CLINIC | Age: 30
End: 2025-01-24

## 2025-01-24 DIAGNOSIS — K29.70 HELICOBACTER PYLORI GASTRITIS: Primary | ICD-10-CM

## 2025-01-24 DIAGNOSIS — B96.81 HELICOBACTER PYLORI GASTRITIS: Primary | ICD-10-CM

## 2025-01-24 RX ORDER — DOXYCYCLINE 100 MG/1
100 CAPSULE ORAL EVERY 12 HOURS SCHEDULED
Qty: 28 CAPSULE | Refills: 0 | Status: SHIPPED | OUTPATIENT
Start: 2025-01-24 | End: 2025-02-07

## 2025-01-24 RX ORDER — METRONIDAZOLE 500 MG/1
500 TABLET ORAL EVERY 8 HOURS SCHEDULED
Qty: 42 TABLET | Refills: 0 | Status: SHIPPED | OUTPATIENT
Start: 2025-01-24 | End: 2025-02-07

## 2025-01-24 RX ORDER — BISMUTH SUBSALICYLATE 262 MG/1
524 TABLET, CHEWABLE ORAL
Qty: 112 TABLET | Refills: 0 | Status: SHIPPED | OUTPATIENT
Start: 2025-01-24 | End: 2025-02-07

## 2025-02-05 ENCOUNTER — TELEPHONE (OUTPATIENT)
Age: 30
End: 2025-02-05

## 2025-02-05 NOTE — TELEPHONE ENCOUNTER
Pt calling in, she finished h pylori antibx today and 2 days ago noticed she may have thrush in her mouth- a white film on tongue and possible vaginal yeast infection. I did advise I would let Dr. Tello know but likely needs to follow up with PCP or gyn to discuss for treatment. Please advise.

## 2025-02-06 DIAGNOSIS — B37.0 ORAL THRUSH: Primary | ICD-10-CM

## 2025-02-06 RX ORDER — NYSTATIN 100000 [USP'U]/ML
500000 SUSPENSION ORAL 4 TIMES DAILY
Qty: 140 ML | Refills: 0 | Status: SHIPPED | OUTPATIENT
Start: 2025-02-06 | End: 2025-02-13

## 2025-02-06 NOTE — PROGRESS NOTES
Called in for oral thrush symptoms. Sent nystatin. Possible vaginal yeast infection and inquiring about diflucan, advised follow up with OB/GYN.

## 2025-02-20 ENCOUNTER — OFFICE VISIT (OUTPATIENT)
Dept: FAMILY MEDICINE CLINIC | Facility: HOSPITAL | Age: 30
End: 2025-02-20
Payer: COMMERCIAL

## 2025-02-20 VITALS
WEIGHT: 154 LBS | BODY MASS INDEX: 26.29 KG/M2 | TEMPERATURE: 97.1 F | SYSTOLIC BLOOD PRESSURE: 111 MMHG | HEART RATE: 95 BPM | OXYGEN SATURATION: 100 % | DIASTOLIC BLOOD PRESSURE: 81 MMHG | HEIGHT: 64 IN

## 2025-02-20 DIAGNOSIS — Z00.00 ANNUAL PHYSICAL EXAM: Primary | ICD-10-CM

## 2025-02-20 DIAGNOSIS — E27.49 ADRENAL CALCIFICATION (HCC): ICD-10-CM

## 2025-02-20 DIAGNOSIS — F32.0 CURRENT MILD EPISODE OF MAJOR DEPRESSIVE DISORDER WITHOUT PRIOR EPISODE (HCC): ICD-10-CM

## 2025-02-20 DIAGNOSIS — G89.29 CHRONIC NONINTRACTABLE HEADACHE, UNSPECIFIED HEADACHE TYPE: ICD-10-CM

## 2025-02-20 DIAGNOSIS — F41.9 ANXIETY: ICD-10-CM

## 2025-02-20 DIAGNOSIS — R51.9 CHRONIC NONINTRACTABLE HEADACHE, UNSPECIFIED HEADACHE TYPE: ICD-10-CM

## 2025-02-20 DIAGNOSIS — J31.0 CHRONIC RHINITIS: ICD-10-CM

## 2025-02-20 DIAGNOSIS — L70.9 ACNE, UNSPECIFIED ACNE TYPE: ICD-10-CM

## 2025-02-20 PROBLEM — D12.6 ADENOMATOUS POLYP OF COLON: Status: ACTIVE | Noted: 2025-02-20

## 2025-02-20 PROCEDURE — 99395 PREV VISIT EST AGE 18-39: CPT | Performed by: INTERNAL MEDICINE

## 2025-02-20 RX ORDER — SPIRONOLACTONE 100 MG/1
100 TABLET, FILM COATED ORAL DAILY
Qty: 90 TABLET | Refills: 2 | Status: SHIPPED | OUTPATIENT
Start: 2025-02-20

## 2025-02-20 RX ORDER — MONTELUKAST SODIUM 10 MG/1
10 TABLET ORAL
Start: 2025-02-20

## 2025-02-20 RX ORDER — AMITRIPTYLINE HYDROCHLORIDE 50 MG/1
50 TABLET ORAL
Qty: 90 TABLET | Refills: 2 | Status: SHIPPED | OUTPATIENT
Start: 2025-02-20

## 2025-02-20 NOTE — ASSESSMENT & PLAN NOTE
Mood doing well with current regimen, notes some mood swings with menses but is manageable, call with new/worse mood. Amitriptyline refilled upon request  Orders:    amitriptyline (ELAVIL) 50 mg tablet; Take 1 tablet (50 mg total) by mouth daily at bedtime

## 2025-02-20 NOTE — PROGRESS NOTES
Adult Annual Physical  Name: Lisandra Babin      : 1995      MRN: [id number]  Encounter Provider: Lupe Booth DO  Encounter Date: 2025   Encounter department: [facility] [address]     Assessment & Plan  Annual physical exam         BMI 26.0-26.9,adult  Encouraged to con't healthy diet but regular formal exercise was encouraged        Chronic rhinitis  Only using Singulair prn - med list updated, no current allergy symptoms   Orders:    montelukast (SINGULAIR) 10 mg tablet; Take 1 tablet (10 mg total) by mouth daily at bedtime as needed (allergies)    Adrenal calcification (HCC)  Noted on CT , no further f/u needed       Current mild episode of major depressive disorder without prior episode (HCC)  Mood doing well with current regimen, notes some mood swings with menses but is manageable, call with new/worse mood. Amitriptyline refilled upon request  Orders:    amitriptyline (ELAVIL) 50 mg tablet; Take 1 tablet (50 mg total) by mouth daily at bedtime    Anxiety  Mood doing well with current regimen, notes some mood swings with menses but is manageable, call with new/worse mood. Amitriptyline refilled upon request  Orders:    amitriptyline (ELAVIL) 50 mg tablet; Take 1 tablet (50 mg total) by mouth daily at bedtime    Chronic nonintractable headache, unspecified headache type  Migraines doing well with current regimen, call with new/worse migraines, Amitriptyline refilled upon request  Orders:    amitriptyline (ELAVIL) 50 mg tablet; Take 1 tablet (50 mg total) by mouth daily at bedtime    Acne, unspecified acne type  Acne well controlled with current regimen, Spironolactone refilled upon request  Orders:    spironolactone (ALDACTONE) 100 mg tablet; Take 1 tablet (100 mg total) by mouth daily 100mg once daily    Immunizations and preventive care screenings were discussed with patient today. Appropriate education was printed on patient's after visit  summary.    Counseling:  Alcohol/drug use: discussed moderation in alcohol intake, the recommendations for healthy alcohol use, and avoidance of illicit drug use.  Dental Health: discussed importance of regular tooth brushing, flossing, and dental visits.  Injury prevention: discussed safety/seat belts, safety helmets, smoke detectors, carbon monoxide detectors, and smoking near bedding or upholstery.  Exercise: the importance of regular exercise/physical activity was discussed. Recommend exercise 3-5 times per week for at least 30 minutes.   Cervical cancer screening: PAP 6/24 - Dr. Larissa Strickland  Colon cancer screening: Colonoscopy 1/25 - 7 yrs d/t polyp     BW 12/24    History of Present Illness     Adult Annual Physical:  Patient presents for annual physical.     Diet and Physical Activity:  - Diet/Nutrition: well balanced diet, limited junk food and consuming 3-5 servings of fruits/vegetables daily.  - Exercise: no formal exercise. 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  - Hearing: normal hearing bilateral ears.  - Vision: no vision problems and most recent eye exam > 1 year ago.  - Dental: regular dental visits and brushes teeth twice daily.    /GYN Health:  - Follows with GYN: yes.   - Menopause: premenopausal.   - History of STDs: no  - Contraception: barrier methods.      Advanced Care Planning:  - Has an advanced directive?: no    - Has a durable medical POA?: no    - ACP document given to patient?: no        EGD and colonoscopy done 1/25 - H.pylori was noted and pt was tx. She notes symptoms have improved and she only notes rare/mild heart burn that is managed with Pepcid.  Polyp (TA) noted and 7 yr f/u recommended.    Pt has seen online Derm and was started on Spironolactone 100 mg 1 tab PO q day for acne.  She notes on SE and the rx is working well.      Review of Systems   Constitutional:  Negative for chills, fatigue, fever and unexpected weight change.  "  HENT:  Negative for congestion, hearing loss, sore throat and trouble swallowing.    Eyes:  Negative for pain and visual disturbance.   Respiratory:  Negative for cough, shortness of breath and wheezing.    Cardiovascular:  Negative for chest pain, palpitations and leg swelling.   Gastrointestinal:  Negative for abdominal pain, blood in stool, constipation, diarrhea, nausea and vomiting.   Genitourinary:  Negative for difficulty urinating, dysuria, menstrual problem, vaginal bleeding and vaginal pain.   Musculoskeletal:  Negative for arthralgias, back pain and gait problem.   Skin:  Negative for rash and wound.   Neurological:  Negative for dizziness and headaches.   Hematological:  Negative for adenopathy. Does not bruise/bleed easily.   Psychiatric/Behavioral:  Negative for dysphoric mood. The patient is not nervous/anxious.          Objective   /81 (BP Location: Left arm, Patient Position: Sitting, Cuff Size: Standard)   Pulse 95   Temp (!) 97.1 °F (36.2 °C)   Ht 5' 4\" (1.626 m)   Wt 69.9 kg (154 lb)   LMP 01/14/2025   SpO2 100%   BMI 26.43 kg/m²     Physical Exam  Vitals and nursing note reviewed.   Constitutional:       General: She is not in acute distress.     Appearance: She is well-developed. She is not ill-appearing.   HENT:      Head: Normocephalic and atraumatic.      Right Ear: Tympanic membrane and external ear normal. There is no impacted cerumen.      Left Ear: Tympanic membrane and external ear normal. There is no impacted cerumen.      Mouth/Throat:      Mouth: Mucous membranes are moist.      Pharynx: Oropharynx is clear. No oropharyngeal exudate.   Eyes:      General:         Right eye: No discharge.         Left eye: No discharge.      Conjunctiva/sclera: Conjunctivae normal.   Neck:      Thyroid: No thyromegaly.      Trachea: No tracheal deviation.      Comments: No thyroid nodules or enlargement noted  Cardiovascular:      Rate and Rhythm: Normal rate and regular rhythm.      " Heart sounds: Normal heart sounds. No murmur heard.  Pulmonary:      Effort: Pulmonary effort is normal. No respiratory distress.      Breath sounds: Normal breath sounds. No wheezing, rhonchi or rales.   Abdominal:      General: There is no distension.      Palpations: Abdomen is soft.      Tenderness: There is no abdominal tenderness. There is no guarding or rebound.   Musculoskeletal:         General: No deformity or signs of injury.      Cervical back: Neck supple.   Lymphadenopathy:      Cervical: No cervical adenopathy.   Skin:     General: Skin is warm and dry.      Coloration: Skin is not pale.      Findings: No bruising or rash.   Neurological:      General: No focal deficit present.      Mental Status: She is alert. Mental status is at baseline.      Motor: No abnormal muscle tone.      Gait: Gait normal.   Psychiatric:         Mood and Affect: Mood normal.         Behavior: Behavior normal.         Thought Content: Thought content normal.         Judgment: Judgment normal.

## 2025-02-20 NOTE — ASSESSMENT & PLAN NOTE
Bpp and nst reassuring.  Induction scheduled on sat at 8am. Will call if she wants to move it. Fetal kick counting reinforced. Signs of labor discussed.     Only using Singulair prn - med list updated, no current allergy symptoms   Orders:    montelukast (SINGULAIR) 10 mg tablet; Take 1 tablet (10 mg total) by mouth daily at bedtime as needed (allergies)

## 2025-02-20 NOTE — ASSESSMENT & PLAN NOTE
Migraines doing well with current regimen, call with new/worse migraines, Amitriptyline refilled upon request  Orders:    amitriptyline (ELAVIL) 50 mg tablet; Take 1 tablet (50 mg total) by mouth daily at bedtime

## 2025-02-20 NOTE — PATIENT INSTRUCTIONS
"Patient Education     Routine physical for adults   The Basics   Written by the doctors and editors at [facility]   What is a physical? -- A physical is a routine visit, or \"check-up,\" with your doctor. You might also hear it called a \"wellness visit\" or \"preventive visit.\"  During each visit, the doctor will:   Ask about your physical and mental health   Ask about your habits, behaviors, and lifestyle   Do an exam   Give you vaccines if needed   Talk to you about any medicines you take   Give advice about your health   Answer your questions  Getting regular check-ups is an important part of taking care of your health. It can help your doctor find and treat any problems you have. But it's also important for preventing health problems.  A routine physical is different from a \"sick visit.\" A sick visit is when you see a doctor because of a health concern or problem. Since physicals are scheduled ahead of time, you can think about what you want to ask the doctor.  How often should I get a physical? -- It depends on your age and health. In general, for people age 21 years and older:   If you are younger than 50 years, you might be able to get a physical every 3 years.   If you are 50 years or older, your doctor might recommend a physical every year.  If you have an ongoing health condition, like diabetes or high blood pressure, your doctor will probably want to see you more often.  What happens during a physical? -- In general, each visit will include:   Physical exam - The doctor or nurse will check your height, weight, heart rate, and blood pressure. They will also look at your eyes and ears. They will ask about how you are feeling and whether you have any symptoms that bother you.   Medicines - It's a good idea to bring a list of all the medicines you take to each doctor visit. Your doctor will talk to you about your medicines and answer any questions. Tell them if you are having any side effects that bother you. You " "should also tell them if you are having trouble paying for any of your medicines.   Habits and behaviors - This includes:   Your diet   Your exercise habits   Whether you smoke, drink alcohol, or use drugs   Whether you are sexually active   Whether you feel safe at home  Your doctor will talk to you about things you can do to improve your health and lower your risk of health problems. They will also offer help and support. For example, if you want to quit smoking, they can give you advice and might prescribe medicines. If you want to improve your diet or get more physical activity, they can help you with this, too.   Lab tests, if needed - The tests you get will depend on your age and situation. For example, your doctor might want to check your:   Cholesterol   Blood sugar   Iron level   Vaccines - The recommended vaccines will depend on your age, health, and what vaccines you already had. Vaccines are very important because they can prevent certain serious or deadly infections.   Discussion of screening - \"Screening\" means checking for diseases or other health problems before they cause symptoms. Your doctor can recommend screening based on your age, risk, and preferences. This might include tests to check for:   Cancer, such as breast, prostate, cervical, ovarian, colorectal, prostate, lung, or skin cancer   Sexually transmitted infections, such as chlamydia and gonorrhea   Mental health conditions like depression and anxiety  Your doctor will talk to you about the different types of screening tests. They can help you decide which screenings to have. They can also explain what the results might mean.   Answering questions - The physical is a good time to ask the doctor or nurse questions about your health. If needed, they can refer you to other doctors or specialists, too.  Adults older than 65 years often need other care, too. As you get older, your doctor will talk to you about:   How to prevent falling at " home   Hearing or vision tests   Memory testing   How to take your medicines safely   Making sure that you have the help and support you need at home  All topics are updated as new evidence becomes available and our peer review process is complete.  This topic retrieved from [employer] on: May 02, 2024.  Topic 409821 Version 1.0  Release: 32.4.3 - C32.122  © 2024 UpToDate, Inc. and/or its affiliates. All rights reserved.  Consumer Information Use and Disclaimer   Disclaimer: This generalized information is a limited summary of diagnosis, treatment, and/or medication information. It is not meant to be comprehensive and should be used as a tool to help the user understand and/or assess potential diagnostic and treatment options. It does NOT include all information about conditions, treatments, medications, side effects, or risks that may apply to a specific patient. It is not intended to be medical advice or a substitute for the medical advice, diagnosis, or treatment of a health care provider based on the health care provider's examination and assessment of a patient's specific and unique circumstances. Patients must speak with a health care provider for complete information about their health, medical questions, and treatment options, including any risks or benefits regarding use of medications. This information does not endorse any treatments or medications as safe, effective, or approved for treating a specific patient. UpToDate, Inc. and its affiliates disclaim any warranty or liability relating to this information or the use thereof.The use of this information is governed by the Terms of Use, available at https://www.wolters[employer]aton [employer] Homesuwer.com/en/know/clinical-effectiveness-terms. 2024© UpToDate, Inc. and its affiliates and/or licensors. All rights reserved.  Copyright   © 2024 UpToDate, Inc. and/or its affiliates. All rights reserved.    Patient Education     Diet and health   The Basics   Written by the doctors and  "editors at [employer]   Why is it important to eat a healthy diet? -- It's important to eat a healthy diet because eating the right foods can keep you healthy now and later in life. It can lower the risk of problems like heart disease, diabetes, high blood pressure, and some types of cancer. It can also help you live longer and improve your quality of life.  What kind of diet is best? -- There is no 1 specific diet that experts recommend for everyone. People choose what foods to eat for many different reasons. These include personal preference, culture, [place], allergies or intolerances, and nutritional goals. People also need to consider the cost and availability of different foods.  In general, experts recommend a diet that:   Includes lots of vegetables, fruits, beans, nuts, and whole grains   Limits red and processed meats, unhealthy fats, sugar, salt, and alcohol  What are dietary patterns? -- A dietary \"pattern\" means generally eating certain types of foods while limiting others. Some people need to follow a specific dietary pattern because of their health needs. For example, if you have high blood pressure, your doctor might recommend a diet low in salt.  If you are trying to improve your health in general, choosing a healthy dietary pattern can help. This does not have to mean being very strict about what you eat or avoid. The goal is to think about getting plenty of healthy foods while limiting less healthy foods.  Examples of dietary patterns include:   Mediterranean diet - This involves eating a lot of fruits, vegetables, nuts, and whole grains, and uses olive oil instead of other fats. It also includes some fish, poultry, and dairy products, but not a lot of red meat. Following this diet can help your overall health, and might even lower your risk of having a stroke.   Plant-based diets - These patterns focus on vegetables, fruits, grains, beans, and nuts. They limit or avoid food that comes from " "animals, such as meat and dairy. There are different types of plant-based diets, including vegetarian and vegan.   Low-fat diet - A low-fat diet involves limiting calories from fat. This might help some people keep weight off if that is their goal, but it does not have many other health benefits. If you choose to follow a low-fat diet, it is also important to focus on getting lots of whole grains, legumes, fruits, and vegetables. Limit refined grains and sugar.   Low-cholesterol diet - Cholesterol is found in foods with a lot of saturated fat, like red meat, butter, and cheese. A low-cholesterol diet focuses on limiting the amount of cholesterol that you eat. Limiting the cholesterol in your diet can also help lower the amount of unhealthy fats that you eat.  Which foods are especially healthy? -- Foods that are especially healthy include:   Fruits and vegetables - Eating a diet with lots of fruits and vegetables can help prevent heart disease and stroke. It might also help prevent certain types of cancer. Try to eat fruits and vegetables at each meal and also for snacks. If you don't have fresh fruits and vegetables available, you can eat frozen or canned ones instead. Doctors recommend eating at least 5 servings of fruits or vegetables each day.   Whole grains - Whole-grain foods include 100 percent whole-wheat bread, steel cut oats, and whole-grain pasta. These are healthier than foods made with \"refined\" grains, like white bread and white rice. Eating lots of whole grains instead of refined grains has been shown to help with weight control. It can also lower the risk of several health problems, including colon cancer, heart disease, and diabetes. Doctors recommend that most people try to eat 5 to 8 servings of whole-grain, high-fiber foods each day.   Foods with fiber - Eating foods with a lot of fiber can help prevent heart disease and stroke. If you have type 2 diabetes, it can also help control your blood " "sugar. Foods that have a lot of fiber include vegetables, fruits, beans, nuts, oatmeal, and whole-grain breads and cereals. You can tell how much fiber is in a food by reading the nutrition label (figure 1). Doctors recommend that most people eat about 25 to 34 grams of fiber each day.   Foods with calcium and vitamin D - Babies, children, and adults need calcium and vitamin D to help keep their bones strong. Adults also need calcium and vitamin D to help prevent osteoporosis. Osteoporosis is a condition that causes bones to get \"thin\" and break more easily than usual. Different foods and drinks have calcium and vitamin D in them (figure 2). People who don't get enough calcium and vitamin D in their diet might need to take a supplement. Doctors recommend that most people have 2 to 3 servings of foods with calcium and vitamin D each day.   Foods with protein - Protein helps your muscles and bones stay strong. Healthy foods with a lot of protein include chicken, fish, eggs, beans, nuts, and soy products. Red meat also has a lot of protein, but it also contains fats, which can be unhealthy. Doctors recommend that most people try to eat about 5 servings of protein each day.   Healthy fats - There are different types of fats. Some types of fats are better for your body than others. Healthy fats are \"monounsaturated\" or \"polyunsaturated\" fats. These are found in fatty fish, nuts and nut butters, and avocados. Use plant-based oils when cooking. Examples of these oils include olive, canola, safflower, sunflower, and corn oil. Eating foods with healthy fats, while avoiding or limiting foods with unhealthy fats, might lower the risk of heart disease.   Foods with folate - Folate is a vitamin that is important for pregnant people, since it helps prevent certain birth defects. It is also called \"folic acid.\" Anyone who could get pregnant should get at least 400 micrograms of folic acid daily, whether or not they are actively " "trying to get pregnant. Folate is found in many breakfast cereals, oranges, orange juice, and green leafy vegetables.  What foods should I avoid or limit? -- To eat a healthy diet, there are some things that you should avoid or limit. They include:   Unhealthy fats - \"Trans\" fats are especially unhealthy. They are found in margarines, many fast foods, and some store-bought baked goods. \"Saturated\" fats are found in animal products like meats, egg yolks, butter, cheese, and full-fat milk products. Unhealthy fats can raise your cholesterol level and increase your chance of getting heart disease.   Sugar - To have a healthy diet, it's important to limit or avoid added sugar, sweets, and refined grains. Refined grains are found in white bread, white rice, most pastas, and most packaged \"snack\" foods.  Avoiding sugar-sweetened beverages, like soda and sports drinks, can also help improve your health.  Avoid canned fruits in \"heavy\" syrup.   Red and processed meats - Studies have shown that eating a lot of red meat can increase your risk of certain health problems, including heart disease and cancer. You should limit the amount of red meat that you eat. This is also true for processed meats like sausage, hot dogs, and elliott.  Can I drink alcohol as part of a healthy diet? -- Not drinking alcohol at all is the healthiest choice. Regular drinking can raise a person's chances of getting liver disease and certain types of cancers. In females, even 1 drink a day can increase the risk of getting breast cancer.  If you do choose to drink, most doctors recommend limiting alcohol to no more than:   1 drink a day for females   2 drinks a day for males  The limits are different because, generally, the female body takes longer to break down alcohol.  How many calories do I need each day? -- Calories give your body energy. The number of calories that you need each day depends on your weight, height, age, sex, and how active you " "are.  Your doctor or nurse can tell you about how many calories you should eat each day. You can also work with a dietitian (nutrition expert) to learn more about your dietary needs and options.  What if I am having trouble improving my diet? -- It can be hard to change the way that you eat. Remember that even small changes can improve your health.  Here are some tips that might help:   Try to make fruits and vegetables part of every meal. If you don't have fresh fruits and vegetables, frozen or canned are good options. Look for products without added salt or sugar.   Keep a bowl of fruit out for snacking.   When you can, choose whole grains instead of refined grains. Choose chicken, fish, and beans instead of red meat and cheese.   Try to eat prepared and processed foods less often.   Try flavored seltzer or water instead of soda or juice.   When eating at fast food restaurants, look for healthier items, like broiled chicken or salad.  If you have questions about which foods you should or should not eat, ask your doctor, nurse, or dietitian. The right diet for you will depend, in part, on your health and any medical conditions you have.  All topics are updated as new evidence becomes available and our peer review process is complete.  This topic retrieved from [employer] on: Feb 28, 2024.  Topic 58426 Version 28.0  Release: 32.2.4 - C32.58  © 2024 UpToDate, Inc. and/or its affiliates. All rights reserved.  figure 1: Nutrition label - Fiber     This is an example of a nutrition label. To figure out how much fiber is in a food, look for the line that says \"Dietary Fiber.\" It's also important to look at the serving size. This food has 7 grams of fiber in each serving, and each serving is 1 cup.  Graphic 95376 Version 8.0  figure 2: Foods and drinks with calcium and vitamin D     Foods rich in calcium include ice cream, soy milk, breads, kale, broccoli, milk, cheese, cottage cheese, almonds, yogurt, ready-to-eat cereals, " "beans, and tofu. Foods rich in vitamin D include milk, fortified plant-based \"milks\" (soy, almond), canned tuna fish, cod liver oil, yogurt, ready-to-eat-cereals, cooked salmon, canned sardines, mackerel, and eggs. Some of these foods are rich in both.  Graphic 08776 Version 4.0  Consumer Information Use and Disclaimer   Disclaimer: This generalized information is a limited summary of diagnosis, treatment, and/or medication information. It is not meant to be comprehensive and should be used as a tool to help the user understand and/or assess potential diagnostic and treatment options. It does NOT include all information about conditions, treatments, medications, side effects, or risks that may apply to a specific patient. It is not intended to be medical advice or a substitute for the medical advice, diagnosis, or treatment of a health care provider based on the health care provider's examination and assessment of a patient's specific and unique circumstances. Patients must speak with a health care provider for complete information about their health, medical questions, and treatment options, including any risks or benefits regarding use of medications. This information does not endorse any treatments or medications as safe, effective, or approved for treating a specific patient. UpToDate, Inc. and its affiliates disclaim any warranty or liability relating to this information or the use thereof.The use of this information is governed by the Terms of Use, available at https://www.wolters[employer] Networksuwer.com/en/know/clinical-effectiveness-terms. 2024© UpToDate, Inc. and its affiliates and/or licensors. All rights reserved.  Copyright   © 2024 UpToDate, Inc. and/or its affiliates. All rights reserved.    Patient Education     Exercise and movement   The Basics   Written by the doctors and editors at [employer]   What are the benefits of movement? -- Moving your body has many benefits. It can:   Burn calories, which helps people " manage their weight   Help control blood sugar levels in people with diabetes   Lower blood pressure, especially in people with high blood pressure   Lower stress, and help with depression and anxiety   Keep bones strong, so they don't get thin and break easily   Lower the chance of dying from heart disease  Adding even small amounts of physical activity to your daily routine can improve your health.  What are the main types of exercise? -- There are 3 main types of exercise:   Aerobic exercise - This raises your heart rate. Examples include walking, running, dancing, riding a bike, and swimming.   Muscle strengthening - This helps make your muscles stronger. You can do it using weights, exercise bands, or weight machines. You can also use your own body weight, as with push-ups, or by lifting items in your home, like jugs of water.   Stretching - These help your muscles and joints move more easily.  It's important to have all 3 types in your exercise program. That way, your body, muscles, and joints can be as healthy as possible.  Should I talk to my doctor or nurse before I start exercising? -- If you have not exercised before or have not exercised in a long time, talk with your doctor or nurse before you start a very active exercise program.  If you have heart disease or risk factors for heart disease (like high blood pressure or diabetes), your doctor or nurse might recommend that you have an exercise test before starting an exercise program.  When you begin an exercise program, start slowly. For example, do the exercise at a slow pace or for a few minutes only. Over time, you can exercise faster and for longer periods of time.  What should I do when I exercise? -- Each time you exercise, you should:   Warm up - This can help keep you from hurting your muscles when you exercise. To warm up, do a light aerobic exercise (such as walking slowly) or stretch for 5 to 10 minutes.   Work out - Try to get a mix of  aerobic exercise, muscle strengthening, and stretching. During an aerobic workout, you can walk fast, swim, run, or use an exercise machine, for example. Other activities, like dancing or playing tennis, are also forms of aerobic exercise. You should also take time to stretch all of your joints, including your neck, shoulders, back, hips, and knees. At least 2 times a week, you can do muscle strengthening exercises as part of your workout.   Cool down - This helps keep you from feeling dizzy after you exercise and helps prevent muscle cramps. To cool down, you can stretch or do a light aerobic exercise for 5 minutes.  Some people go to a gym or do group exercise classes. But you can exercise even without these things. Some exercises can be done even in a small space. You can also try online videos or smartphone apps to get ideas for different types of exercise.  How often should I exercise? -- Doctors recommend that people exercise at least 30 minutes a day, on 5 or more days of the week.  If you can't exercise for 30 minutes straight, try to exercise for 10 minutes at a time, 3 or 4 times a day. Even exercising for shorter amounts of time is good for you, especially if it means spending less time sitting.  When should I call my doctor or nurse? -- If you have any of the following symptoms when you exercise, stop exercising and call your doctor or nurse right away:   Pain or pressure in your chest, arms, throat, jaw, or back   Nausea or vomiting   Feeling like your heart is fluttering or racing very fast   Feeling dizzy or faint  What if I don't have time to exercise? -- Many people have very busy lives and might not think that they have time to exercise. But it's important to try to find time, even if you are tired or work a lot. Exercise can increase your energy level, which can make you feel better and might even help you get more work done.  Even if it's hard to set aside a lot of time to exercise, you can still  improve your health by moving your body more. There are many ways that you can be more active. For example, you can:   Take the stairs instead of the elevator.   Park in a parking space that is farther away from the door.   Take a longer route when you walk from one place to another.  Spending a lot of time sitting still (for example, watching TV or working on the computer) is bad for your health. Try to get up and move around whenever you can. Even small amounts of movement, like taking short walks, doing household chores, or gardening, can improve your health. Finding activities that you enjoy, or doing them with other people, can help you add more movement into your daily life.  What else should I do when I exercise? -- To exercise safely and avoid problems, it's important to:   Drink fluids during and after exercising (but avoid drinks with a lot of caffeine or sugar).   Avoid exercising outside if it is too hot or cold.   Wear layers of clothes, so that you can take them off if you get too hot.   Wear shoes that fit well and support your feet.   Be aware of your surroundings if you exercise outside.  All topics are updated as new evidence becomes available and our peer review process is complete.  This topic retrieved from [email or web address] on: May 18, 2024.  Topic 18341 Version 31.0  Release: 32.4.3 - C32.137  © 2024 UpToDate, Inc. and/or its affiliates. All rights reserved.  Consumer Information Use and Disclaimer   Disclaimer: This generalized information is a limited summary of diagnosis, treatment, and/or medication information. It is not meant to be comprehensive and should be used as a tool to help the user understand and/or assess potential diagnostic and treatment options. It does NOT include all information about conditions, treatments, medications, side effects, or risks that may apply to a specific patient. It is not intended to be medical advice or a substitute for the medical advice, diagnosis, or  treatment of a health care provider based on the health care provider's examination and assessment of a patient's specific and unique circumstances. Patients must speak with a health care provider for complete information about their health, medical questions, and treatment options, including any risks or benefits regarding use of medications. This information does not endorse any treatments or medications as safe, effective, or approved for treating a specific patient. [employer] and its affiliates disclaim any warranty or liability relating to this information or the use thereof.The use of this information is governed by the Terms of Use, available at https://www.[employer].com/en/know/clinical-effectiveness-terms. 2024© [employer] and its affiliates and/or licensors. All rights reserved.  Copyright   © 2024 [employer] and/or its affiliates. All rights reserved.

## 2025-05-12 DIAGNOSIS — J31.0 CHRONIC RHINITIS: Primary | ICD-10-CM

## 2025-08-21 ENCOUNTER — OFFICE VISIT (OUTPATIENT)
Dept: FAMILY MEDICINE CLINIC | Facility: HOSPITAL | Age: 30
End: 2025-08-21
Payer: COMMERCIAL

## 2025-08-21 VITALS
WEIGHT: 140 LBS | HEIGHT: 64 IN | SYSTOLIC BLOOD PRESSURE: 104 MMHG | OXYGEN SATURATION: 99 % | TEMPERATURE: 98.3 F | HEART RATE: 85 BPM | DIASTOLIC BLOOD PRESSURE: 80 MMHG | BODY MASS INDEX: 23.9 KG/M2

## 2025-08-21 DIAGNOSIS — L70.9 ACNE, UNSPECIFIED ACNE TYPE: ICD-10-CM

## 2025-08-21 DIAGNOSIS — R11.0 NAUSEA: Primary | ICD-10-CM

## 2025-08-21 DIAGNOSIS — G89.29 CHRONIC NONINTRACTABLE HEADACHE, UNSPECIFIED HEADACHE TYPE: ICD-10-CM

## 2025-08-21 DIAGNOSIS — F41.9 ANXIETY: ICD-10-CM

## 2025-08-21 DIAGNOSIS — F32.0 CURRENT MILD EPISODE OF MAJOR DEPRESSIVE DISORDER WITHOUT PRIOR EPISODE (HCC): ICD-10-CM

## 2025-08-21 DIAGNOSIS — R51.9 CHRONIC NONINTRACTABLE HEADACHE, UNSPECIFIED HEADACHE TYPE: ICD-10-CM

## 2025-08-21 PROBLEM — N92.6 IRREGULAR MENSES: Status: RESOLVED | Noted: 2024-06-20 | Resolved: 2025-08-21

## 2025-08-21 PROCEDURE — 99214 OFFICE O/P EST MOD 30 MIN: CPT | Performed by: INTERNAL MEDICINE

## 2025-08-21 RX ORDER — METOCLOPRAMIDE 5 MG/1
5 TABLET ORAL 4 TIMES DAILY PRN
Qty: 60 TABLET | Refills: 2 | Status: SHIPPED | OUTPATIENT
Start: 2025-08-21

## 2025-08-21 RX ORDER — FEXOFENADINE HCL AND PSEUDOEPHEDRINE HCL 180; 240 MG/1; MG/1
1 TABLET, EXTENDED RELEASE ORAL DAILY
COMMUNITY

## 2025-08-21 RX ORDER — AMITRIPTYLINE HYDROCHLORIDE 50 MG/1
50 TABLET ORAL
Qty: 90 TABLET | Refills: 2 | Status: SHIPPED | OUTPATIENT
Start: 2025-08-21

## 2025-08-21 RX ORDER — SPIRONOLACTONE 100 MG/1
100 TABLET, FILM COATED ORAL DAILY
Qty: 90 TABLET | Refills: 2 | Status: SHIPPED | OUTPATIENT
Start: 2025-08-21